# Patient Record
Sex: FEMALE | Race: WHITE
[De-identification: names, ages, dates, MRNs, and addresses within clinical notes are randomized per-mention and may not be internally consistent; named-entity substitution may affect disease eponyms.]

---

## 2017-09-28 VITALS — SYSTOLIC BLOOD PRESSURE: 165 MMHG | DIASTOLIC BLOOD PRESSURE: 79 MMHG

## 2017-10-09 ENCOUNTER — HOSPITAL ENCOUNTER (INPATIENT)
Dept: HOSPITAL 8 - OR | Age: 82
LOS: 1 days | Discharge: HOME | DRG: 739 | End: 2017-10-10
Attending: SPECIALIST | Admitting: SPECIALIST
Payer: MEDICARE

## 2017-10-09 VITALS — WEIGHT: 292.77 LBS | HEIGHT: 61 IN | BODY MASS INDEX: 55.28 KG/M2

## 2017-10-09 DIAGNOSIS — Z88.0: ICD-10-CM

## 2017-10-09 DIAGNOSIS — J96.11: ICD-10-CM

## 2017-10-09 DIAGNOSIS — E66.01: ICD-10-CM

## 2017-10-09 DIAGNOSIS — Z88.1: ICD-10-CM

## 2017-10-09 DIAGNOSIS — C54.1: Primary | ICD-10-CM

## 2017-10-09 DIAGNOSIS — Z88.2: ICD-10-CM

## 2017-10-09 DIAGNOSIS — E78.00: ICD-10-CM

## 2017-10-09 DIAGNOSIS — J96.21: ICD-10-CM

## 2017-10-09 PROCEDURE — 86850 RBC ANTIBODY SCREEN: CPT

## 2017-10-09 PROCEDURE — 85025 COMPLETE CBC W/AUTO DIFF WBC: CPT

## 2017-10-09 PROCEDURE — 0UT2FZZ RESECTION OF BILATERAL OVARIES, VIA NATURAL OR ARTIFICIAL OPENING WITH PERCUTANEOUS ENDOSCOPIC ASSISTANCE: ICD-10-PCS | Performed by: SPECIALIST

## 2017-10-09 PROCEDURE — 36415 COLL VENOUS BLD VENIPUNCTURE: CPT

## 2017-10-09 PROCEDURE — 88112 CYTOPATH CELL ENHANCE TECH: CPT

## 2017-10-09 PROCEDURE — 86923 COMPATIBILITY TEST ELECTRIC: CPT

## 2017-10-09 PROCEDURE — 88329 PATH CONSLTJ DRG SURG: CPT

## 2017-10-09 PROCEDURE — 0UT7FZZ RESECTION OF BILATERAL FALLOPIAN TUBES, VIA NATURAL OR ARTIFICIAL OPENING WITH PERCUTANEOUS ENDOSCOPIC ASSISTANCE: ICD-10-PCS | Performed by: SPECIALIST

## 2017-10-09 PROCEDURE — 88307 TISSUE EXAM BY PATHOLOGIST: CPT

## 2017-10-09 PROCEDURE — 88331 PATH CONSLTJ SURG 1 BLK 1SPC: CPT

## 2017-10-09 PROCEDURE — 88333 PATH CONSLTJ SURG CYTO XM 1: CPT

## 2017-10-09 PROCEDURE — 86900 BLOOD TYPING SEROLOGIC ABO: CPT

## 2017-10-09 PROCEDURE — 88342 IMHCHEM/IMCYTCHM 1ST ANTB: CPT

## 2017-10-09 PROCEDURE — 88305 TISSUE EXAM BY PATHOLOGIST: CPT

## 2017-10-09 PROCEDURE — 0UT9FZZ RESECTION OF UTERUS, VIA NATURAL OR ARTIFICIAL OPENING WITH PERCUTANEOUS ENDOSCOPIC ASSISTANCE: ICD-10-PCS | Performed by: SPECIALIST

## 2017-10-09 PROCEDURE — 07BC4ZX EXCISION OF PELVIS LYMPHATIC, PERCUTANEOUS ENDOSCOPIC APPROACH, DIAGNOSTIC: ICD-10-PCS | Performed by: SPECIALIST

## 2017-10-09 PROCEDURE — 80048 BASIC METABOLIC PNL TOTAL CA: CPT

## 2017-10-09 PROCEDURE — 8E0W4CZ ROBOTIC ASSISTED PROCEDURE OF TRUNK REGION, PERCUTANEOUS ENDOSCOPIC APPROACH: ICD-10-PCS | Performed by: SPECIALIST

## 2017-10-09 PROCEDURE — G0461 IMMUNOHISTO/CYTO CHEM 1ST ST: HCPCS

## 2017-10-09 PROCEDURE — 93005 ELECTROCARDIOGRAM TRACING: CPT

## 2017-10-09 PROCEDURE — 88309 TISSUE EXAM BY PATHOLOGIST: CPT

## 2017-10-09 RX ADMIN — OXYCODONE HYDROCHLORIDE PRN MG: 5 SOLUTION ORAL at 18:40

## 2017-10-09 RX ADMIN — SODIUM CHLORIDE, SODIUM LACTATE, POTASSIUM CHLORIDE, AND CALCIUM CHLORIDE SCH MLS/HR: .6; .31; .03; .02 INJECTION, SOLUTION INTRAVENOUS at 16:07

## 2017-10-09 RX ADMIN — SODIUM CHLORIDE, SODIUM LACTATE, POTASSIUM CHLORIDE, AND CALCIUM CHLORIDE SCH MLS/HR: .6; .31; .03; .02 INJECTION, SOLUTION INTRAVENOUS at 22:53

## 2017-10-09 RX ADMIN — METOCLOPRAMIDE HYDROCHLORIDE SCH MG: 10 TABLET ORAL at 23:19

## 2017-10-09 RX ADMIN — HYDROMORPHONE HYDROCHLORIDE PRN MG: 1 INJECTION, SOLUTION INTRAMUSCULAR; INTRAVENOUS; SUBCUTANEOUS at 19:14

## 2017-10-09 RX ADMIN — METOCLOPRAMIDE HYDROCHLORIDE SCH MG: 10 TABLET ORAL at 21:30

## 2017-10-09 RX ADMIN — HYDROMORPHONE HYDROCHLORIDE PRN MG: 1 INJECTION, SOLUTION INTRAMUSCULAR; INTRAVENOUS; SUBCUTANEOUS at 19:35

## 2017-10-10 VITALS — SYSTOLIC BLOOD PRESSURE: 124 MMHG | DIASTOLIC BLOOD PRESSURE: 64 MMHG

## 2017-10-10 VITALS — DIASTOLIC BLOOD PRESSURE: 49 MMHG | SYSTOLIC BLOOD PRESSURE: 104 MMHG

## 2017-10-10 VITALS — SYSTOLIC BLOOD PRESSURE: 93 MMHG | DIASTOLIC BLOOD PRESSURE: 48 MMHG

## 2017-10-10 VITALS — DIASTOLIC BLOOD PRESSURE: 52 MMHG | SYSTOLIC BLOOD PRESSURE: 106 MMHG

## 2017-10-10 LAB
BUN SERPL-MCNC: 11 MG/DL (ref 7–18)
HCT VFR BLD CALC: 38.3 % (ref 34.6–47.8)
HGB BLD-MCNC: 12.8 G/DL (ref 11.7–16.4)
WBC # BLD AUTO: 7.3 X10^3/UL (ref 3.4–10)

## 2017-10-10 RX ADMIN — OXYCODONE HYDROCHLORIDE PRN MG: 5 SOLUTION ORAL at 10:48

## 2017-10-10 RX ADMIN — METOCLOPRAMIDE HYDROCHLORIDE SCH MG: 10 TABLET ORAL at 11:22

## 2017-10-10 RX ADMIN — DEXTROSE, SODIUM CHLORIDE, AND POTASSIUM CHLORIDE SCH MLS/HR: 5; .45; .15 INJECTION INTRAVENOUS at 00:28

## 2017-10-10 RX ADMIN — METOCLOPRAMIDE HYDROCHLORIDE SCH MG: 10 TABLET ORAL at 04:47

## 2017-10-10 RX ADMIN — DEXTROSE, SODIUM CHLORIDE, AND POTASSIUM CHLORIDE SCH MLS/HR: 5; .45; .15 INJECTION INTRAVENOUS at 08:00

## 2022-11-01 ENCOUNTER — HOSPITAL ENCOUNTER (INPATIENT)
Facility: MEDICAL CENTER | Age: 87
LOS: 3 days | DRG: 243 | End: 2022-11-04
Attending: EMERGENCY MEDICINE | Admitting: INTERNAL MEDICINE
Payer: MEDICARE

## 2022-11-01 ENCOUNTER — APPOINTMENT (OUTPATIENT)
Dept: CARDIOLOGY | Facility: MEDICAL CENTER | Age: 87
DRG: 243 | End: 2022-11-01
Attending: INTERNAL MEDICINE
Payer: MEDICARE

## 2022-11-01 DIAGNOSIS — R55 SYNCOPE, UNSPECIFIED SYNCOPE TYPE: ICD-10-CM

## 2022-11-01 DIAGNOSIS — I45.5 SINUS PAUSE: ICD-10-CM

## 2022-11-01 DIAGNOSIS — I45.5 SINUS ARREST: ICD-10-CM

## 2022-11-01 DIAGNOSIS — I48.91 ATRIAL FIBRILLATION WITH RVR (HCC): ICD-10-CM

## 2022-11-01 LAB
ALBUMIN SERPL BCP-MCNC: 3.6 G/DL (ref 3.2–4.9)
ALBUMIN/GLOB SERPL: 1.4 G/DL
ALP SERPL-CCNC: 137 U/L (ref 30–99)
ALT SERPL-CCNC: 19 U/L (ref 2–50)
ANION GAP SERPL CALC-SCNC: 8 MMOL/L (ref 7–16)
AST SERPL-CCNC: 24 U/L (ref 12–45)
BASOPHILS # BLD AUTO: 0.3 % (ref 0–1.8)
BASOPHILS # BLD: 0.02 K/UL (ref 0–0.12)
BILIRUB SERPL-MCNC: 0.2 MG/DL (ref 0.1–1.5)
BUN SERPL-MCNC: 18 MG/DL (ref 8–22)
CALCIUM SERPL-MCNC: 8.8 MG/DL (ref 8.5–10.5)
CHLORIDE SERPL-SCNC: 101 MMOL/L (ref 96–112)
CO2 SERPL-SCNC: 29 MMOL/L (ref 20–33)
CREAT SERPL-MCNC: 0.76 MG/DL (ref 0.5–1.4)
EKG IMPRESSION: NORMAL
EOSINOPHIL # BLD AUTO: 0.07 K/UL (ref 0–0.51)
EOSINOPHIL NFR BLD: 1.2 % (ref 0–6.9)
ERYTHROCYTE [DISTWIDTH] IN BLOOD BY AUTOMATED COUNT: 46.8 FL (ref 35.9–50)
GFR SERPLBLD CREATININE-BSD FMLA CKD-EPI: 76 ML/MIN/1.73 M 2
GLOBULIN SER CALC-MCNC: 2.5 G/DL (ref 1.9–3.5)
GLUCOSE SERPL-MCNC: 107 MG/DL (ref 65–99)
HCT VFR BLD AUTO: 31.3 % (ref 37–47)
HGB BLD-MCNC: 9.7 G/DL (ref 12–16)
IMM GRANULOCYTES # BLD AUTO: 0.03 K/UL (ref 0–0.11)
IMM GRANULOCYTES NFR BLD AUTO: 0.5 % (ref 0–0.9)
LYMPHOCYTES # BLD AUTO: 1.29 K/UL (ref 1–4.8)
LYMPHOCYTES NFR BLD: 22.4 % (ref 22–41)
MCH RBC QN AUTO: 29.8 PG (ref 27–33)
MCHC RBC AUTO-ENTMCNC: 31 G/DL (ref 33.6–35)
MCV RBC AUTO: 96.3 FL (ref 81.4–97.8)
MONOCYTES # BLD AUTO: 0.44 K/UL (ref 0–0.85)
MONOCYTES NFR BLD AUTO: 7.6 % (ref 0–13.4)
NEUTROPHILS # BLD AUTO: 3.91 K/UL (ref 2–7.15)
NEUTROPHILS NFR BLD: 68 % (ref 44–72)
NRBC # BLD AUTO: 0 K/UL
NRBC BLD-RTO: 0 /100 WBC
PLATELET # BLD AUTO: 127 K/UL (ref 164–446)
PMV BLD AUTO: 10.5 FL (ref 9–12.9)
POTASSIUM SERPL-SCNC: 4.4 MMOL/L (ref 3.6–5.5)
PROT SERPL-MCNC: 6.1 G/DL (ref 6–8.2)
RBC # BLD AUTO: 3.25 M/UL (ref 4.2–5.4)
SODIUM SERPL-SCNC: 138 MMOL/L (ref 135–145)
TROPONIN T SERPL-MCNC: 22 NG/L (ref 6–19)
WBC # BLD AUTO: 5.8 K/UL (ref 4.8–10.8)

## 2022-11-01 PROCEDURE — 92953 TEMPORARY EXTERNAL PACING: CPT

## 2022-11-01 PROCEDURE — 85610 PROTHROMBIN TIME: CPT

## 2022-11-01 PROCEDURE — 85025 COMPLETE CBC W/AUTO DIFF WBC: CPT

## 2022-11-01 PROCEDURE — 85730 THROMBOPLASTIN TIME PARTIAL: CPT

## 2022-11-01 PROCEDURE — 02HK3JZ INSERTION OF PACEMAKER LEAD INTO RIGHT VENTRICLE, PERCUTANEOUS APPROACH: ICD-10-PCS | Performed by: INTERNAL MEDICINE

## 2022-11-01 PROCEDURE — 93005 ELECTROCARDIOGRAM TRACING: CPT

## 2022-11-01 PROCEDURE — 700101 HCHG RX REV CODE 250

## 2022-11-01 PROCEDURE — C1894 INTRO/SHEATH, NON-LASER: HCPCS

## 2022-11-01 PROCEDURE — 36415 COLL VENOUS BLD VENIPUNCTURE: CPT

## 2022-11-01 PROCEDURE — 84484 ASSAY OF TROPONIN QUANT: CPT

## 2022-11-01 PROCEDURE — 80053 COMPREHEN METABOLIC PANEL: CPT

## 2022-11-01 PROCEDURE — 5A2204Z RESTORATION OF CARDIAC RHYTHM, SINGLE: ICD-10-PCS | Performed by: EMERGENCY MEDICINE

## 2022-11-01 PROCEDURE — 5A1223Z PERFORMANCE OF CARDIAC PACING, CONTINUOUS: ICD-10-PCS | Performed by: INTERNAL MEDICINE

## 2022-11-01 PROCEDURE — 99223 1ST HOSP IP/OBS HIGH 75: CPT | Performed by: INTERNAL MEDICINE

## 2022-11-01 PROCEDURE — 770000 HCHG ROOM/CARE - INTERMEDIATE ICU *

## 2022-11-01 PROCEDURE — 99291 CRITICAL CARE FIRST HOUR: CPT

## 2022-11-01 RX ORDER — ONDANSETRON 2 MG/ML
4 INJECTION INTRAMUSCULAR; INTRAVENOUS EVERY 4 HOURS PRN
Status: DISCONTINUED | OUTPATIENT
Start: 2022-11-01 | End: 2022-11-02

## 2022-11-01 RX ORDER — OXYCODONE HYDROCHLORIDE 5 MG/1
2.5 TABLET ORAL
Status: DISCONTINUED | OUTPATIENT
Start: 2022-11-01 | End: 2022-11-04 | Stop reason: HOSPADM

## 2022-11-01 RX ORDER — OXYCODONE HYDROCHLORIDE 5 MG/1
5 TABLET ORAL
Status: DISCONTINUED | OUTPATIENT
Start: 2022-11-01 | End: 2022-11-04 | Stop reason: HOSPADM

## 2022-11-01 RX ORDER — AMOXICILLIN 250 MG
2 CAPSULE ORAL 2 TIMES DAILY
Status: DISCONTINUED | OUTPATIENT
Start: 2022-11-02 | End: 2022-11-04 | Stop reason: HOSPADM

## 2022-11-01 RX ORDER — MORPHINE SULFATE 4 MG/ML
2 INJECTION INTRAVENOUS
Status: DISCONTINUED | OUTPATIENT
Start: 2022-11-01 | End: 2022-11-04 | Stop reason: HOSPADM

## 2022-11-01 RX ORDER — LIDOCAINE HYDROCHLORIDE 20 MG/ML
INJECTION, SOLUTION INFILTRATION; PERINEURAL
Status: COMPLETED
Start: 2022-11-01 | End: 2022-11-01

## 2022-11-01 RX ORDER — ONDANSETRON 4 MG/1
4 TABLET, ORALLY DISINTEGRATING ORAL EVERY 4 HOURS PRN
Status: DISCONTINUED | OUTPATIENT
Start: 2022-11-01 | End: 2022-11-02

## 2022-11-01 RX ORDER — ENOXAPARIN SODIUM 100 MG/ML
40 INJECTION SUBCUTANEOUS DAILY
Status: DISCONTINUED | OUTPATIENT
Start: 2022-11-02 | End: 2022-11-02

## 2022-11-01 RX ORDER — BISACODYL 10 MG
10 SUPPOSITORY, RECTAL RECTAL
Status: DISCONTINUED | OUTPATIENT
Start: 2022-11-01 | End: 2022-11-04 | Stop reason: HOSPADM

## 2022-11-01 RX ORDER — HYDRALAZINE HYDROCHLORIDE 20 MG/ML
10 INJECTION INTRAMUSCULAR; INTRAVENOUS EVERY 4 HOURS PRN
Status: DISCONTINUED | OUTPATIENT
Start: 2022-11-01 | End: 2022-11-04 | Stop reason: HOSPADM

## 2022-11-01 RX ORDER — POLYETHYLENE GLYCOL 3350 17 G/17G
1 POWDER, FOR SOLUTION ORAL
Status: DISCONTINUED | OUTPATIENT
Start: 2022-11-01 | End: 2022-11-04 | Stop reason: HOSPADM

## 2022-11-01 RX ORDER — MIDAZOLAM HYDROCHLORIDE 1 MG/ML
INJECTION INTRAMUSCULAR; INTRAVENOUS
Status: COMPLETED
Start: 2022-11-01 | End: 2022-11-02

## 2022-11-01 RX ORDER — ACETAMINOPHEN 325 MG/1
650 TABLET ORAL EVERY 6 HOURS PRN
Status: DISCONTINUED | OUTPATIENT
Start: 2022-11-01 | End: 2022-11-04 | Stop reason: HOSPADM

## 2022-11-01 RX ADMIN — LIDOCAINE HYDROCHLORIDE: 20 INJECTION, SOLUTION INFILTRATION; PERINEURAL at 23:30

## 2022-11-02 ENCOUNTER — APPOINTMENT (OUTPATIENT)
Dept: CARDIOLOGY | Facility: MEDICAL CENTER | Age: 87
DRG: 243 | End: 2022-11-02
Attending: INTERNAL MEDICINE
Payer: MEDICARE

## 2022-11-02 ENCOUNTER — APPOINTMENT (OUTPATIENT)
Dept: CARDIOLOGY | Facility: MEDICAL CENTER | Age: 87
DRG: 243 | End: 2022-11-02
Attending: NURSE PRACTITIONER
Payer: MEDICARE

## 2022-11-02 ENCOUNTER — APPOINTMENT (OUTPATIENT)
Dept: RADIOLOGY | Facility: MEDICAL CENTER | Age: 87
DRG: 243 | End: 2022-11-02
Attending: INTERNAL MEDICINE
Payer: MEDICARE

## 2022-11-02 PROBLEM — J44.9 COPD (CHRONIC OBSTRUCTIVE PULMONARY DISEASE) (HCC): Status: ACTIVE | Noted: 2022-11-02

## 2022-11-02 PROBLEM — R00.1 BRADYCARDIA: Status: ACTIVE | Noted: 2022-11-02

## 2022-11-02 PROBLEM — K21.9 GERD (GASTROESOPHAGEAL REFLUX DISEASE): Status: ACTIVE | Noted: 2022-11-02

## 2022-11-02 PROBLEM — I48.91 ATRIAL FIBRILLATION WITH RVR (HCC): Status: ACTIVE | Noted: 2022-11-02

## 2022-11-02 LAB
ALBUMIN SERPL BCP-MCNC: 3.2 G/DL (ref 3.2–4.9)
ALBUMIN/GLOB SERPL: 1.2 G/DL
ALP SERPL-CCNC: 136 U/L (ref 30–99)
ALT SERPL-CCNC: 18 U/L (ref 2–50)
ANION GAP SERPL CALC-SCNC: 7 MMOL/L (ref 7–16)
APTT PPP: 33.1 SEC (ref 24.7–36)
AST SERPL-CCNC: 22 U/L (ref 12–45)
BASOPHILS # BLD AUTO: 0.5 % (ref 0–1.8)
BASOPHILS # BLD: 0.03 K/UL (ref 0–0.12)
BILIRUB SERPL-MCNC: 0.2 MG/DL (ref 0.1–1.5)
BUN SERPL-MCNC: 17 MG/DL (ref 8–22)
CALCIUM SERPL-MCNC: 8.5 MG/DL (ref 8.5–10.5)
CHLORIDE SERPL-SCNC: 105 MMOL/L (ref 96–112)
CO2 SERPL-SCNC: 29 MMOL/L (ref 20–33)
CREAT SERPL-MCNC: 0.77 MG/DL (ref 0.5–1.4)
D DIMER PPP IA.FEU-MCNC: 1.47 UG/ML (FEU) (ref 0–0.5)
EOSINOPHIL # BLD AUTO: 0.08 K/UL (ref 0–0.51)
EOSINOPHIL NFR BLD: 1.4 % (ref 0–6.9)
ERYTHROCYTE [DISTWIDTH] IN BLOOD BY AUTOMATED COUNT: 46 FL (ref 35.9–50)
GFR SERPLBLD CREATININE-BSD FMLA CKD-EPI: 75 ML/MIN/1.73 M 2
GLOBULIN SER CALC-MCNC: 2.7 G/DL (ref 1.9–3.5)
GLUCOSE SERPL-MCNC: 107 MG/DL (ref 65–99)
HCT VFR BLD AUTO: 31.6 % (ref 37–47)
HGB BLD-MCNC: 9.7 G/DL (ref 12–16)
IMM GRANULOCYTES # BLD AUTO: 0.02 K/UL (ref 0–0.11)
IMM GRANULOCYTES NFR BLD AUTO: 0.4 % (ref 0–0.9)
INR PPP: 1.13 (ref 0.87–1.13)
LV EJECT FRACT  99904: 60
LV EJECT FRACT MOD 2C 99903: 45.29
LV EJECT FRACT MOD 4C 99902: 28.55
LV EJECT FRACT MOD BP 99901: 39.58
LYMPHOCYTES # BLD AUTO: 1.54 K/UL (ref 1–4.8)
LYMPHOCYTES NFR BLD: 27.7 % (ref 22–41)
MCH RBC QN AUTO: 29.3 PG (ref 27–33)
MCHC RBC AUTO-ENTMCNC: 30.7 G/DL (ref 33.6–35)
MCV RBC AUTO: 95.5 FL (ref 81.4–97.8)
MONOCYTES # BLD AUTO: 0.54 K/UL (ref 0–0.85)
MONOCYTES NFR BLD AUTO: 9.7 % (ref 0–13.4)
NEUTROPHILS # BLD AUTO: 3.35 K/UL (ref 2–7.15)
NEUTROPHILS NFR BLD: 60.3 % (ref 44–72)
NRBC # BLD AUTO: 0 K/UL
NRBC BLD-RTO: 0 /100 WBC
NT-PROBNP SERPL IA-MCNC: 798 PG/ML (ref 0–125)
PLATELET # BLD AUTO: 123 K/UL (ref 164–446)
PMV BLD AUTO: 10.2 FL (ref 9–12.9)
POTASSIUM SERPL-SCNC: 4.3 MMOL/L (ref 3.6–5.5)
PROT SERPL-MCNC: 5.9 G/DL (ref 6–8.2)
PROTHROMBIN TIME: 14.4 SEC (ref 12–14.6)
RBC # BLD AUTO: 3.31 M/UL (ref 4.2–5.4)
SODIUM SERPL-SCNC: 141 MMOL/L (ref 135–145)
TSH SERPL DL<=0.005 MIU/L-ACNC: 3.77 UIU/ML (ref 0.38–5.33)
WBC # BLD AUTO: 5.6 K/UL (ref 4.8–10.8)

## 2022-11-02 PROCEDURE — 93306 TTE W/DOPPLER COMPLETE: CPT

## 2022-11-02 PROCEDURE — 80053 COMPREHEN METABOLIC PANEL: CPT

## 2022-11-02 PROCEDURE — 0JH606Z INSERTION OF PACEMAKER, DUAL CHAMBER INTO CHEST SUBCUTANEOUS TISSUE AND FASCIA, OPEN APPROACH: ICD-10-PCS | Performed by: INTERNAL MEDICINE

## 2022-11-02 PROCEDURE — 700101 HCHG RX REV CODE 250

## 2022-11-02 PROCEDURE — 700101 HCHG RX REV CODE 250: Performed by: INTERNAL MEDICINE

## 2022-11-02 PROCEDURE — 85025 COMPLETE CBC W/AUTO DIFF WBC: CPT

## 2022-11-02 PROCEDURE — 33210 INSERT ELECTRD/PM CATH SNGL: CPT | Performed by: INTERNAL MEDICINE

## 2022-11-02 PROCEDURE — 02H60JZ INSERTION OF PACEMAKER LEAD INTO RIGHT ATRIUM, OPEN APPROACH: ICD-10-PCS | Performed by: INTERNAL MEDICINE

## 2022-11-02 PROCEDURE — 700111 HCHG RX REV CODE 636 W/ 250 OVERRIDE (IP)

## 2022-11-02 PROCEDURE — 770020 HCHG ROOM/CARE - TELE (206)

## 2022-11-02 PROCEDURE — 99152 MOD SED SAME PHYS/QHP 5/>YRS: CPT | Performed by: INTERNAL MEDICINE

## 2022-11-02 PROCEDURE — 700102 HCHG RX REV CODE 250 W/ 637 OVERRIDE(OP): Performed by: INTERNAL MEDICINE

## 2022-11-02 PROCEDURE — A9270 NON-COVERED ITEM OR SERVICE: HCPCS | Performed by: INTERNAL MEDICINE

## 2022-11-02 PROCEDURE — 93306 TTE W/DOPPLER COMPLETE: CPT | Mod: 26 | Performed by: INTERNAL MEDICINE

## 2022-11-02 PROCEDURE — 84443 ASSAY THYROID STIM HORMONE: CPT

## 2022-11-02 PROCEDURE — 33208 INSRT HEART PM ATRIAL & VENT: CPT | Mod: KX | Performed by: INTERNAL MEDICINE

## 2022-11-02 PROCEDURE — 85379 FIBRIN DEGRADATION QUANT: CPT

## 2022-11-02 PROCEDURE — 99223 1ST HOSP IP/OBS HIGH 75: CPT | Mod: AI | Performed by: INTERNAL MEDICINE

## 2022-11-02 PROCEDURE — 71045 X-RAY EXAM CHEST 1 VIEW: CPT

## 2022-11-02 PROCEDURE — 700111 HCHG RX REV CODE 636 W/ 250 OVERRIDE (IP): Performed by: INTERNAL MEDICINE

## 2022-11-02 PROCEDURE — 83880 ASSAY OF NATRIURETIC PEPTIDE: CPT

## 2022-11-02 PROCEDURE — 02HK0JZ INSERTION OF PACEMAKER LEAD INTO RIGHT VENTRICLE, OPEN APPROACH: ICD-10-PCS | Performed by: INTERNAL MEDICINE

## 2022-11-02 PROCEDURE — C1898 LEAD, PMKR, OTHER THAN TRANS: HCPCS

## 2022-11-02 PROCEDURE — C1894 INTRO/SHEATH, NON-LASER: HCPCS

## 2022-11-02 RX ORDER — TOLTERODINE 2 MG/1
2 CAPSULE, EXTENDED RELEASE ORAL DAILY
COMMUNITY

## 2022-11-02 RX ORDER — OXYBUTYNIN CHLORIDE 5 MG/1
5 TABLET ORAL DAILY
Status: DISCONTINUED | OUTPATIENT
Start: 2022-11-02 | End: 2022-11-04 | Stop reason: HOSPADM

## 2022-11-02 RX ORDER — LIDOCAINE HYDROCHLORIDE 20 MG/ML
INJECTION, SOLUTION INFILTRATION; PERINEURAL
Status: COMPLETED
Start: 2022-11-02 | End: 2022-11-02

## 2022-11-02 RX ORDER — MIDAZOLAM HYDROCHLORIDE 1 MG/ML
INJECTION INTRAMUSCULAR; INTRAVENOUS
Status: COMPLETED
Start: 2022-11-02 | End: 2022-11-02

## 2022-11-02 RX ORDER — POLYETHYLENE GLYCOL 3350 17 G/17G
17 POWDER, FOR SOLUTION ORAL DAILY
COMMUNITY

## 2022-11-02 RX ORDER — ATORVASTATIN CALCIUM 10 MG/1
10 TABLET, FILM COATED ORAL DAILY
Status: DISCONTINUED | OUTPATIENT
Start: 2022-11-02 | End: 2022-11-04 | Stop reason: HOSPADM

## 2022-11-02 RX ORDER — POTASSIUM CHLORIDE 1.5 G/1.58G
20 POWDER, FOR SOLUTION ORAL 2 TIMES DAILY
COMMUNITY

## 2022-11-02 RX ORDER — LIDOCAINE 50 MG/G
2 PATCH TOPICAL EVERY 24 HOURS
Status: ON HOLD | COMMUNITY
End: 2022-11-03

## 2022-11-02 RX ORDER — BUMETANIDE 1 MG/1
1 TABLET ORAL DAILY
COMMUNITY

## 2022-11-02 RX ORDER — SERTRALINE HYDROCHLORIDE 100 MG/1
200 TABLET, FILM COATED ORAL DAILY
Status: DISCONTINUED | OUTPATIENT
Start: 2022-11-02 | End: 2022-11-04 | Stop reason: HOSPADM

## 2022-11-02 RX ORDER — LIDOCAINE 50 MG/G
1 OINTMENT TOPICAL
COMMUNITY

## 2022-11-02 RX ORDER — GABAPENTIN 100 MG/1
100 CAPSULE ORAL 2 TIMES DAILY
COMMUNITY

## 2022-11-02 RX ORDER — OMEPRAZOLE 20 MG/1
20 CAPSULE, DELAYED RELEASE ORAL DAILY
COMMUNITY

## 2022-11-02 RX ORDER — BUMETANIDE 1 MG/1
1 TABLET ORAL DAILY
Status: DISCONTINUED | OUTPATIENT
Start: 2022-11-02 | End: 2022-11-04 | Stop reason: HOSPADM

## 2022-11-02 RX ORDER — POTASSIUM CHLORIDE 20 MEQ/1
20 TABLET, EXTENDED RELEASE ORAL DAILY
Status: DISCONTINUED | OUTPATIENT
Start: 2022-11-02 | End: 2022-11-04 | Stop reason: HOSPADM

## 2022-11-02 RX ORDER — HYDROCODONE BITARTRATE AND ACETAMINOPHEN 5; 325 MG/1; MG/1
.5-1 TABLET ORAL
COMMUNITY

## 2022-11-02 RX ORDER — DOCUSATE SODIUM 50 MG/5ML
100 LIQUID ORAL 2 TIMES DAILY
Status: DISCONTINUED | OUTPATIENT
Start: 2022-11-02 | End: 2022-11-04 | Stop reason: HOSPADM

## 2022-11-02 RX ORDER — GABAPENTIN 100 MG/1
100 CAPSULE ORAL 2 TIMES DAILY
Status: DISCONTINUED | OUTPATIENT
Start: 2022-11-02 | End: 2022-11-04 | Stop reason: HOSPADM

## 2022-11-02 RX ORDER — LIDOCAINE 50 MG/G
2 PATCH TOPICAL EVERY 24 HOURS
Status: DISCONTINUED | OUTPATIENT
Start: 2022-11-02 | End: 2022-11-04 | Stop reason: HOSPADM

## 2022-11-02 RX ORDER — HYDROCODONE BITARTRATE AND ACETAMINOPHEN 5; 325 MG/1; MG/1
0.5 TABLET ORAL EVERY 6 HOURS PRN
Status: DISCONTINUED | OUTPATIENT
Start: 2022-11-02 | End: 2022-11-04 | Stop reason: HOSPADM

## 2022-11-02 RX ORDER — M-VIT,TX,IRON,MINS/CALC/FOLIC 27MG-0.4MG
1 TABLET ORAL DAILY
COMMUNITY

## 2022-11-02 RX ORDER — CEFAZOLIN SODIUM 1 G/3ML
INJECTION, POWDER, FOR SOLUTION INTRAMUSCULAR; INTRAVENOUS
Status: COMPLETED
Start: 2022-11-02 | End: 2022-11-02

## 2022-11-02 RX ORDER — ALBUTEROL SULFATE 90 UG/1
2 AEROSOL, METERED RESPIRATORY (INHALATION)
Status: DISCONTINUED | OUTPATIENT
Start: 2022-11-02 | End: 2022-11-04 | Stop reason: HOSPADM

## 2022-11-02 RX ORDER — CELECOXIB 100 MG/1
100 CAPSULE ORAL 2 TIMES DAILY
COMMUNITY

## 2022-11-02 RX ORDER — ATORVASTATIN CALCIUM 10 MG/1
10 TABLET, FILM COATED ORAL DAILY
COMMUNITY

## 2022-11-02 RX ORDER — OMEPRAZOLE 20 MG/1
20 CAPSULE, DELAYED RELEASE ORAL DAILY
Status: DISCONTINUED | OUTPATIENT
Start: 2022-11-02 | End: 2022-11-04 | Stop reason: HOSPADM

## 2022-11-02 RX ORDER — ASPIRIN 81 MG
100 TABLET, DELAYED RELEASE (ENTERIC COATED) ORAL 2 TIMES DAILY
COMMUNITY

## 2022-11-02 RX ORDER — LIDOCAINE 50 MG/G
OINTMENT TOPICAL DAILY
Status: DISCONTINUED | OUTPATIENT
Start: 2022-11-02 | End: 2022-11-04 | Stop reason: HOSPADM

## 2022-11-02 RX ADMIN — CEFAZOLIN 3000 MG: 330 INJECTION, POWDER, FOR SOLUTION INTRAMUSCULAR; INTRAVENOUS at 13:24

## 2022-11-02 RX ADMIN — POTASSIUM CHLORIDE 20 MEQ: 20 TABLET, EXTENDED RELEASE ORAL at 06:00

## 2022-11-02 RX ADMIN — LIDOCAINE HYDROCHLORIDE: 20 INJECTION, SOLUTION INFILTRATION; PERINEURAL at 13:46

## 2022-11-02 RX ADMIN — FENTANYL CITRATE 100 MCG: 50 INJECTION, SOLUTION INTRAMUSCULAR; INTRAVENOUS at 13:55

## 2022-11-02 RX ADMIN — MIDAZOLAM 2 MG: 1 INJECTION, SOLUTION INTRAMUSCULAR; INTRAVENOUS at 13:46

## 2022-11-02 RX ADMIN — SENNOSIDES AND DOCUSATE SODIUM 2 TABLET: 50; 8.6 TABLET ORAL at 06:23

## 2022-11-02 RX ADMIN — ENOXAPARIN SODIUM 40 MG: 40 INJECTION SUBCUTANEOUS at 02:17

## 2022-11-02 RX ADMIN — LIDOCAINE 2 PATCH: 50 PATCH CUTANEOUS at 06:31

## 2022-11-02 RX ADMIN — DOCUSATE SODIUM 100 MG: 50 LIQUID ORAL at 06:31

## 2022-11-02 RX ADMIN — MIDAZOLAM 1 MG: 1 INJECTION, SOLUTION INTRAMUSCULAR; INTRAVENOUS at 00:14

## 2022-11-02 RX ADMIN — LIDOCAINE: 50 OINTMENT TOPICAL at 06:32

## 2022-11-02 RX ADMIN — OMEPRAZOLE 20 MG: 20 CAPSULE, DELAYED RELEASE ORAL at 06:23

## 2022-11-02 RX ADMIN — SERTRALINE 200 MG: 100 TABLET, FILM COATED ORAL at 06:40

## 2022-11-02 RX ADMIN — FENTANYL CITRATE 50 MCG: 50 INJECTION, SOLUTION INTRAMUSCULAR; INTRAVENOUS at 00:14

## 2022-11-02 RX ADMIN — GABAPENTIN 100 MG: 100 CAPSULE ORAL at 06:24

## 2022-11-02 RX ADMIN — BUMETANIDE 1 MG: 1 TABLET ORAL at 06:28

## 2022-11-02 RX ADMIN — CEFAZOLIN 1000 MG: 330 INJECTION, POWDER, FOR SOLUTION INTRAMUSCULAR; INTRAVENOUS at 13:54

## 2022-11-02 RX ADMIN — OXYBUTYNIN CHLORIDE 5 MG: 5 TABLET ORAL at 06:24

## 2022-11-02 RX ADMIN — FENTANYL CITRATE 100 MCG: 50 INJECTION, SOLUTION INTRAMUSCULAR; INTRAVENOUS at 13:46

## 2022-11-02 RX ADMIN — GABAPENTIN 100 MG: 100 CAPSULE ORAL at 18:00

## 2022-11-02 RX ADMIN — ATORVASTATIN CALCIUM 10 MG: 10 TABLET, FILM COATED ORAL at 06:24

## 2022-11-02 ASSESSMENT — ENCOUNTER SYMPTOMS
PHOTOPHOBIA: 0
SPEECH CHANGE: 0
BLOOD IN STOOL: 0
LOSS OF CONSCIOUSNESS: 0
CHILLS: 0
FOCAL WEAKNESS: 0
ABDOMINAL PAIN: 0
FLANK PAIN: 0
POLYDIPSIA: 0
CHEST TIGHTNESS: 0
DOUBLE VISION: 0
BRUISES/BLEEDS EASILY: 0
DIZZINESS: 0
SHORTNESS OF BREATH: 0
COUGH: 0
SORE THROAT: 0
BACK PAIN: 0
DIARRHEA: 0
SPUTUM PRODUCTION: 0
HEARTBURN: 0
WEIGHT LOSS: 0
TREMORS: 0
HEADACHES: 0
NAUSEA: 0
NECK PAIN: 0
HALLUCINATIONS: 0
LOSS OF CONSCIOUSNESS: 1
PALPITATIONS: 0
BLURRED VISION: 0
ORTHOPNEA: 0
NERVOUS/ANXIOUS: 0
VOMITING: 0
HEMOPTYSIS: 0
FEVER: 0

## 2022-11-02 ASSESSMENT — LIFESTYLE VARIABLES: SUBSTANCE_ABUSE: 0

## 2022-11-02 ASSESSMENT — PATIENT HEALTH QUESTIONNAIRE - PHQ9
2. FEELING DOWN, DEPRESSED, IRRITABLE, OR HOPELESS: NOT AT ALL
SUM OF ALL RESPONSES TO PHQ9 QUESTIONS 1 AND 2: 0
1. LITTLE INTEREST OR PLEASURE IN DOING THINGS: NOT AT ALL

## 2022-11-02 ASSESSMENT — FIBROSIS 4 INDEX: FIB4 SCORE: 3.58

## 2022-11-02 ASSESSMENT — PAIN DESCRIPTION - PAIN TYPE: TYPE: CHRONIC PAIN

## 2022-11-02 NOTE — CARE PLAN
The patient is Watcher - Medium risk of patient condition declining or worsening    Shift Goals  Clinical Goals: stable HR  Patient Goals: stable HR      Problem: Knowledge Deficit - Standard  Goal: Patient and family/care givers will demonstrate understanding of plan of care, disease process/condition, diagnostic tests and medications  Outcome: Progressing     Problem: Skin Integrity  Goal: Skin integrity is maintained or improved  Outcome: Progressing     Problem: Fall Risk  Goal: Patient will remain free from falls  Outcome: Progressing     Problem: Pain - Standard  Goal: Alleviation of pain or a reduction in pain to the patient’s comfort goal  Outcome: Progressing

## 2022-11-02 NOTE — CONSULTS
CARDIOLOGY CONSULTATION NOTE      Date of Consultation: 11/1/2022  Consulting Provider: Harsha Chowdhury MD    Patient Name: Lindsey Shell    YOB: 1935  MRN: [id number]    Reason for Consultation:   Syncope, sinus arrest    History of Present Illness:   Lindsey Shell is an 86 yr old woman with anxiety/depression, DL, ? COPD O2 dependent 3.5L via NC, sedentary, class 3 obesity, lives in nursing home at [place] transferred for several witnessed syncopal events with reports of up to 20 sec pauses and intermittent A fib with RVR. Reviewed EMS strips showing A fib with RVR and IVCD and sinus arrest episodes.    Non-smoker. No known DM, HTN, CVA/TIA, CHF , A fib history. Denies marijuana use.    In the ER, intermittently transcutaneously paced with baseline SR.    ECG in the ER: personally reviewed  SR, poor R wave progression, artifact, possible age indeterminate anterior infarct, low voltage, IVCD    2 daughters and 2 grand-daughters at bedside.    Intermittently transcutaneously paced during encounter, bothering her.    Family History:  No known heart disease among parents or siblings. Has granddaughter with WPW (at bedside).    Medical History:     Past Medical History:   Diagnosis Date    Chronic obstructive pulmonary disease (HCC)        Surgical History:   History reviewed. No pertinent surgical history.    Family History:   History reviewed. No pertinent family history.    Social History:    reports that she has never smoked. She has never used smokeless tobacco. She reports that she does not currently use alcohol. She reports that she does not use drugs.  The patient is a non smoker    Medications and Allergies:     Current Facility-Administered Medications   Medication Dose Route Frequency Provider Last Rate Last Admin    LIDOCAINE HCL 2 % INJ SOLN              No current outpatient medications on file.       Allergies   Allergen Reactions    Erythromycin Swelling     Hand swelling     "Penicillins Swelling     Mouth, denies anaphylaxis     Sulfa Drugs Swelling     Mouth, denies anaphylaxis        Review of Systems:   A pertinent review of systems was performed and was unremarkable except as per HPI above.    Vital Signs:   /60   Pulse 67   Resp 16   Ht 1.549 m (5' 1\")   Wt 119 kg (263 lb)   SpO2 97%   BMI 49.69 kg/m²   Vitals:    11/01/22 2226 11/01/22 2300 11/01/22 2306   BP:  114/56 124/60   Pulse:  65 67   Resp:  16 16   SpO2:  96% 97%   Weight: 119 kg (263 lb)     Height: 1.549 m (5' 1\")       Body mass index is 49.69 kg/m².  Oxygen Therapy:  Pulse Oximetry: 97 %, O2 (LPM): 3.5, O2 Delivery Device: Nasal Cannula  Physical Exam    Physical Examination:     General: Chronically ill appearing, No acute distress. Intermittently transcutaneously paced during encounter.  HEENT: EOM grossly intact, no scleral icterus, no pharyngeal erythema.  Neck:  JVD difficult to assess, no bruits, trachea midline  Cardiovascular: Regular rate and rhythm. Normal S1, S2. Distant heart sounds. Possible grade 2/3 systolic murmur no R/G. Distant heart sounds with intermittent pacing limits accurate murmur evaluation. Trace LE edema with non-pitting edema primarily.   2+ radial pulses  Pulmonary: decreased air entry with mild rhonchi. No wheezing or crackles. Normal respiratory effort.  Abdomen: Soft, NT, protuberant.  MSK/Ext: No clubbing or cyanosis.  Skin: Warm and dry, no rashes.  Psych: A&O x 3, appropriate affect.    Laboratories:   CrCl cannot be calculated (No successful lab value found.).  No results for input(s): CREATININE, BUN, POTASSIUM, SODIUM, CALCIUM, MAGNESIUM, CO2, ALBUMIN in the last 72 hours.  No results for input(s): GLUCOSE, POCGLUCOSE in the last 72 hours.  No results for input(s): ASTSGOT, ALTSGPT, ALKPHOSPHAT, INR in the last 72 hours.    Invalid input(s): BILI  No results for input(s): WBC, HEMOGLOBIN, PLATELETCT in the last 72 hours.  No results for input(s): TROPONINT, NTPROBNP, " HBA1C in the last 72 hours.  No results found for: LDL  No results found for: HDL    No results found for: TRIGLYCERIDE    No results found for: CHOLSTRLTOT    Assessment and Recommendations:   # Sinus arrest  # New A fib with RVR with aberrancy  # SR in ER  # Systolic murmur  # no known prior CV medical history    - plan for emergent temp wire tonight  - TTE in am and accordingly can discuss with EP PPM or AICD plan (if EF is low). QRS<100ms while in SR.  - hold off AC until PPM / AICD plan is finalized  - further recs to follow based on progress and testing results  - discussed brief GOC with family at bedside, prefers to think about code status before finalizing. For now, she is  DNI.  - check TSH, D-dimer, NT proBNP, trop, CMP, CBC    Cardiology will continue to follow along.    Please contact us with any questions.    Richard Delarosa MD, MPH FACC [facility]  Interventional Cardiologist  [facility]   of Clinical Internal Medicine - [facility]

## 2022-11-02 NOTE — CONSULTS
Consults    [facility] Acceptance Note    Called by Dr. Gardner for admission to IMCU for temporary transvenous pacemaker.  Will admit to IMCU under the care of the hospitalist (Dr. Melo).    Enrrique Oates MD

## 2022-11-02 NOTE — PROGRESS NOTES
Pt received from cath lab. Transvenous pacer in place via R IJ sheath. Pt no complaints at this time. Call light and needs within reach. Will continue to monitor closely.

## 2022-11-02 NOTE — ASSESSMENT & PLAN NOTE
New onset.  Cardioverted to sinus rhythm  Monitor on telemetry  Relatively benign TTE with preserved LVEF  CHADS/VASC 3: points for age and sex  Start DOAC

## 2022-11-02 NOTE — CONSULTS
"Cardiology Initial Consultation    Date of Service  11/2/2022    Referring Physician  Alton Bae, *    Reason for Consultation  pAF with RVR,    History of Presenting Illness  Lindsey Shell is a 86 y.o. female admitted from nursing home in [place] with multiple syncopal episodes with AF with RVR and observed sinus arrest pauses.     Per EMS report patient rhythm was AF with RVR (130's-140's) with bradycardia and long sinus pauses. Patient received 150 mg amio in route.     Patient arrived with transcutaneous pacer in place. Underwent temporary pacemaker placement with am with Dr. Delarosa.     Currently maintaining NSR. TVP in place, lower rate reduced to 50 bpm by Dr. Patel.    EP consulted for rhythm management and PPM placement.     TTE reviewed by Dr. Patel showed no significant valvular abnormality with preserved LVEF.     Other past medical history pertinent for COPD with O2 dependence (3.5 L via NC), obesity and depression.    Patient states that she had an episode of syncope approximately one year ago and was in her normal states of health until yesterday when she began having episodes of presyncope. States that she would feel dizzy and as if the \"floor fell out from under me\". This was followed by two ashia episodes of syncope.     Denies any other cardiac symptoms such as palpitations, SOB or chest pain.     Granddaughter has WPW/ Denies any other significant family cardiac history.    Denies toxic habits.     Labs reviewed showing TSH WNL and no significant electrolyte abnormality.     Patient is right handed.     Review of Systems  Review of Systems   Constitutional:  Negative for fever.   Respiratory:  Negative for chest tightness and shortness of breath.    Cardiovascular:  Positive for leg swelling (Chronic). Negative for chest pain and palpitations.   Gastrointestinal:  Negative for abdominal pain and blood in stool.   Genitourinary:  Negative for hematuria.   Musculoskeletal:  " Negative for gait problem.   Neurological:  Negative for dizziness and syncope.   All other systems reviewed and are negative.    Past Medical History   has a past medical history of Chronic obstructive pulmonary disease (HCC).    Surgical History   has no past surgical history on file.    Family History  family history is not on file.    Social History   reports that she has never smoked. She has never used smokeless tobacco. She reports that she does not currently use alcohol. She reports that she does not use drugs.    Medications  Prior to Admission Medications   Prescriptions Last Dose Informant Patient Reported? Taking?   CALCIUM CARB-CHOLECALCIFEROL PO   Yes Yes   Sig: Take 1 Tablet by mouth 2 times a day with meals.   HYDROcodone-acetaminophen (NORCO) 5-325 MG Tab per tablet   Yes Yes   Sig: Take 0.5 Tablets by mouth 3 times a day.   atorvastatin (LIPITOR) 10 MG Tab   Yes Yes   Sig: Take 10 mg by mouth every day.   bumetanide (BUMEX) 1 MG Tab   Yes Yes   Sig: Take 1 mg by mouth every day. Hold if SBP < 100 pr DBP < 60   celecoxib (CELEBREX) 100 MG Cap   Yes Yes   Sig: Take 100 mg by mouth 2 times a day.   diclofenac sodium (VOLTAREN) 1 % Gel   Yes Yes   Sig: Apply  topically 3 times a day. Apply to R shoulder   docusate sodium (COLACE) 100 MG/10ML Liquid   Yes Yes   Sig: Take 100 mg by mouth 2 times a day.   gabapentin (NEURONTIN) 100 MG Cap   Yes Yes   Sig: Take 100 mg by mouth 2 times a day.   lidocaine (LIDODERM) 5 % Patch   Yes Yes   Sig: Place 2 Patches on the skin every 24 hours. BL knees   lidocaine (XYLOCAINE) 5 % Ointment   Yes Yes   Sig: Apply  topically 3 times a day. Apply to BL hands   omeprazole (PRILOSEC) 20 MG delayed-release capsule   Yes Yes   Sig: Take 20 mg by mouth every day.   polyethylene glycol/lytes (MIRALAX) 17 g Pack   Yes Yes   Sig: Take 17 g by mouth every day.   potassium chloride (KLOR-CON) 20 MEQ Pack   Yes Yes   Sig: Take 20 mEq by mouth 2 times a day.   sertraline (ZOLOFT)  100 MG Tab   Yes Yes   Sig: Take 200 mg by mouth every day.   therapeutic multivitamin-minerals (THERAGRAN-M) Tab   Yes Yes   Sig: Take 1 Tablet by mouth every day.   tolterodine ER (DETROL-LA) 2 MG CAPSULE SR 24 HR   Yes Yes   Sig: Take 2 mg by mouth every day.      Facility-Administered Medications: None       Allergies  Allergies   Allergen Reactions    Erythromycin Swelling     Hand swelling    Penicillins Swelling     Mouth, denies anaphylaxis     Sulfa Drugs Swelling     Mouth, denies anaphylaxis        Vital signs in last 24 hours  Temp:  [36.7 °C (98.1 °F)] 36.7 °C (98.1 °F)  Pulse:  [57-72] 61  Resp:  [12-59] 12  BP: ()/(54-67) 95/54  SpO2:  [93 %-98 %] 98 %    Physical Exam  Physical Exam  Constitutional:       General: She is not in acute distress.     Appearance: Normal appearance.   HENT:      Head: Normocephalic.   Eyes:      Extraocular Movements: Extraocular movements intact.   Neck:      Comments: RIJ TVP in place.   Cardiovascular:      Rate and Rhythm: Normal rate and regular rhythm.      Pulses: Normal pulses.      Heart sounds: Normal heart sounds.   Pulmonary:      Effort: Pulmonary effort is normal.      Breath sounds: Normal breath sounds.   Abdominal:      Palpations: Abdomen is soft.      Tenderness: There is no abdominal tenderness.   Musculoskeletal:      Cervical back: Normal range of motion.      Right lower leg: No edema.      Left lower leg: No edema.   Skin:     General: Skin is warm and dry.   Neurological:      Mental Status: She is alert and oriented to person, place, and time.   Psychiatric:         Mood and Affect: Mood normal.         Behavior: Behavior normal.         Thought Content: Thought content normal.       Lab Review  Lab Results   Component Value Date/Time    WBC 5.6 11/02/2022 01:05 AM    RBC 3.31 (L) 11/02/2022 01:05 AM    HEMOGLOBIN 9.7 (L) 11/02/2022 01:05 AM    HEMATOCRIT 31.6 (L) 11/02/2022 01:05 AM    MCV 95.5 11/02/2022 01:05 AM    MCH 29.3 11/02/2022  01:05 AM    MCHC 30.7 (L) 11/02/2022 01:05 AM    MPV 10.2 11/02/2022 01:05 AM      Lab Results   Component Value Date/Time    SODIUM 141 11/02/2022 01:05 AM    POTASSIUM 4.3 11/02/2022 01:05 AM    CHLORIDE 105 11/02/2022 01:05 AM    CO2 29 11/02/2022 01:05 AM    GLUCOSE 107 (H) 11/02/2022 01:05 AM    BUN 17 11/02/2022 01:05 AM    CREATININE 0.77 11/02/2022 01:05 AM      Lab Results   Component Value Date/Time    ASTSGOT 22 11/02/2022 01:05 AM    ALTSGPT 18 11/02/2022 01:05 AM     Lab Results   Component Value Date/Time    TROPONINT 22 (H) 11/01/2022 10:35 PM       Recent Labs     11/02/22  0105   NTPROBNP 798*         Assessment/Plan  Tachy-Newton Syndrome  Sinus Arrest   pAF with RVR    - Plan for permanent PPM placement today with Dr. Salbador Gray. TTE showed preserved LV function per Dr. Patel bedside review, final results pending.   - Consider use of AV quintin blocker to control RVR post PPM.  - New diagnosis of AF. Patient is asymptomatic. [id number] score is 3 (age and gender). Discussed the risks and benefits of OAC with patient. Patient denies history of significant bleeding. Will initiate DOAC tomorrow post PPM. Currently maintaining NSR.     The risks, benefits, and alternatives to permanent pacemaker placement were discussed in great detail.    Specific risks mentioned to the patient including bleeding, cardiac perforation with possible tamponade possibly requiring pericardiocentesis or open heart surgery.    In addition the possibility of lead dislodgment (2-3%), pneumothorax (3%), hemothorax, infection were discussed.    Also, mentioned were the risk of death, stroke, and myocardial infarction.    The patient verbalized understanding of the potential complications and wishes to proceed with the procedure.      Thank you for allowing me to participate in the care of this patient.    Please see Dr. Gray's attestation for further additions and recommendations.     Please contact me with any  questions.    FAIZA Louie.   Cardiology, [facility]  [phone]      My total time spent caring for the patient on the day of the encounter was 22 minutes.     This does not include time spent on separately billable procedures/tests.

## 2022-11-02 NOTE — PROGRESS NOTES
"Hospital Medicine Daily Progress Note    Date of Service  11/2/2022    Chief Complaint  Lindsey Shell is a 86 y.o. female admitted 11/1/2022 with syncope    Hospital Course  85yo PMHx COPD, chronic hypoxic resp failure on 3.5 L HOT, DM, HTN, depression.  Resident of a SNF and sent to us after multiple syncopal episodes with up to 20 sec pauses.  Temp pacer placed in ED    Interval Problem Update  Pt states she's feeling \"OK\".  No CP, SOB, dizziness, palpitations, cold sweats, unusual back/neck/jaw or shoulder pain.  Is having her normal chronic joint pain    DW pt's family at bedside x 34mins    AFebrile  Paced/sinus 60  -110  On 4 LNC    I have discussed this patient's plan of care and discharge plan at IDT rounds today with Case Management, Nursing, Nursing leadership, and other members of the IDT team.    Consultants/Specialty  cardiology    Code Status  Full Code    Disposition  Patient is not medically cleared for discharge.   Anticipate discharge to to skilled nursing facility.  I have placed the appropriate orders for post-discharge needs.    Review of Systems  Review of Systems   Constitutional:  Negative for chills and fever.   HENT:  Negative for nosebleeds and sore throat.    Eyes:  Negative for blurred vision and double vision.   Respiratory:  Negative for cough and shortness of breath.    Cardiovascular:  Positive for leg swelling. Negative for chest pain and palpitations.   Gastrointestinal:  Negative for abdominal pain, diarrhea, nausea and vomiting.   Genitourinary:  Negative for dysuria and urgency.   Musculoskeletal:  Negative for back pain.   Skin:  Negative for rash.   Neurological:  Negative for dizziness, loss of consciousness and headaches.      Physical Exam  Pulse:  [60-67] 63  Resp:  [13-41] 16  BP: (105-124)/(56-64) 105/60  SpO2:  [96 %-98 %] 98 %    Physical Exam  Constitutional:       General: She is not in acute distress.     Appearance: She is well-developed. She is obese. She " is not diaphoretic.   HENT:      Head: Normocephalic and atraumatic.   Neck:      Vascular: No JVD.   Cardiovascular:      Rate and Rhythm: Normal rate and regular rhythm.      Heart sounds: Murmur heard.   Pulmonary:      Effort: Pulmonary effort is normal. No respiratory distress.      Breath sounds: No stridor. No wheezing or rales.   Abdominal:      Palpations: Abdomen is soft.      Tenderness: There is no abdominal tenderness. There is no guarding or rebound.   Musculoskeletal:         General: No tenderness.      Right lower leg: Edema present.      Left lower leg: Edema present.   Skin:     General: Skin is warm and dry.      Capillary Refill: Capillary refill takes less than 2 seconds.      Findings: No rash.   Neurological:      Mental Status: She is alert and oriented to person, place, and time.   Psychiatric:         Mood and Affect: Mood normal.         Behavior: Behavior normal.         Thought Content: Thought content normal.       Fluids    Intake/Output Summary (Last 24 hours) at 11/2/2022 0712  Last data filed at 11/2/2022 0439  Gross per 24 hour   Intake --   Output 1100 ml   Net -1100 ml       Laboratory  Recent Labs     11/01/22 2235 11/02/22  0105   WBC 5.8 5.6   RBC 3.25* 3.31*   HEMOGLOBIN 9.7* 9.7*   HEMATOCRIT 31.3* 31.6*   MCV 96.3 95.5   MCH 29.8 29.3   MCHC 31.0* 30.7*   RDW 46.8 46.0   PLATELETCT 127* 123*   MPV 10.5 10.2     Recent Labs     11/01/22 2235 11/02/22  0105   SODIUM 138 141   POTASSIUM 4.4 4.3   CHLORIDE 101 105   CO2 29 29   GLUCOSE 107* 107*   BUN 18 17   CREATININE 0.76 0.77   CALCIUM 8.8 8.5     Recent Labs     11/01/22 2235   APTT 33.1   INR 1.13               Imaging  EC-ECHOCARDIOGRAM COMPLETE W/O CONT   Final Result      CL-TEMPORARY PACEMAKER INSERT    (Results Pending)   CL-PERMANENT PACEMAKER INSERTION    (Results Pending)        Assessment/Plan  * Sinus arrest- (present on admission)  Assessment & Plan  Status post temporary transvenous pacemaker  Not on any  quintin blocking agents  No signficant e- abnormalities  Plan PPM    GERD (gastroesophageal reflux disease)  Assessment & Plan  Continue omeprazole    COPD (chronic obstructive pulmonary disease) ([facility])  Assessment & Plan  Oxygen dependent  Not in exacerbation  Continue supplemental oxygen  Albuterol as needed    Atrial fibrillation with RVR ([facility])  Assessment & Plan  New onset.  Cardioverted to sinus rhythm  Monitor on telemetry  Relatively benign TTE with preserved LVEF  CHADS/VASC 3: points for age and sex  Will hold off on starting anticoagulation, pending pacemaker placement       VTE prophylaxis: enoxaparin ppx    I have performed a physical exam and reviewed and updated ROS and Plan today (11/2/2022). In review of yesterday's note (11/1/2022), there are no changes except as documented above.

## 2022-11-02 NOTE — ASSESSMENT & PLAN NOTE
Status post temporary transvenous pacemaker  Not on any quintin blocking agents  No signficant e- abnormalities  PPM placed 11/2: interogated and working appropriately  Reviewed arm precautions with her

## 2022-11-02 NOTE — DISCHARGE PLANNING
Received Choice form at 1226  Agency[facility]Facility Name: [facility]  Referral sent per Choice form @ [phone]  [facility] Name: [facility]  Outcome: DPA left v-mail confirming Pt will be ready to return tomorrow. DPA requested call back    [phone]  Ag[facility] Name: [facility]  Outcome: DPA left v-mail asking for bed availability tomorrow. DPA requested call back

## 2022-11-02 NOTE — DIETARY
NUTRITION SERVICES: BMI - Pt with BMI >40 (=Body mass index is 52.07 kg/m².), Class III obesity. Weight loss counseling not appropriate in acute care setting. RECOMMEND - If appropriate at DC please refer to outpatient services for weight management.

## 2022-11-02 NOTE — ED TRIAGE NOTES
"Chief Complaint   Patient presents with    Syncope    Irregular Heart Beat     Pt BIB EMS as transfer from [facility]. Pt presented for recurrent syncopal episodes starting at 10 am this morning. On ED arrival pt found to be in a fib -140, EMS reports pt would joaquín down and experience long sinus pauses causing syncopal events. Pt received 150 amio, 100 fentanyl, 3 versed, and 1.5 L NS prior to arrival. Pt currently being demand paced at 60 bpm.     Pt reports hx of COPD on 3.5L NC.     /56   Pulse 65   Resp 16   Ht 1.549 m (5' 1\")   Wt 119 kg (263 lb)   SpO2 96%   BMI 49.69 kg/m²       "

## 2022-11-02 NOTE — H&P
Hospital Medicine History & Physical Note    Date of Service  11/2/2022    Primary Care Physician  Pcp Pt States None    Consultants  Interventional cardiology Dr. Zhao    Code Status  Full Code    Chief Complaint  Chief Complaint   Patient presents with    Syncope    Irregular Heart Beat       History of Presenting Illness  Lindsey Shell is a 86 y.o. female with past medical history of COPD, oxygen dependent on 3.5 L, anxiety and depression, who presented 11/1/2022 as a transfer from nursing home for evaluation of multiple syncopal episodes secondary to up to 20 seconds pulses and intermittent A. fib with RVR, without prior history of heart disease.  Patient arrived with transcutaneous pacer..  Cardiology consulted and patient undergone placement of temporary transvenous pacemaker.  Afterwards patient admitted to [facility].    I discussed the plan of care with patient.    Review of Systems  Review of Systems   Constitutional:  Negative for chills, fever and weight loss.   HENT:  Negative for ear pain, hearing loss and tinnitus.    Eyes:  Negative for blurred vision, double vision and photophobia.   Respiratory:  Negative for cough, hemoptysis and sputum production.    Cardiovascular:  Negative for chest pain, palpitations and orthopnea.   Gastrointestinal:  Negative for heartburn, nausea and vomiting.   Genitourinary:  Negative for dysuria, flank pain, frequency and hematuria.   Musculoskeletal:  Negative for back pain, joint pain and neck pain.   Skin:  Negative for itching and rash.   Neurological:  Positive for loss of consciousness. Negative for tremors, speech change, focal weakness and headaches.   Endo/Heme/Allergies:  Negative for environmental allergies and polydipsia. Does not bruise/bleed easily.   Psychiatric/Behavioral:  Negative for hallucinations and substance abuse. The patient is not nervous/anxious.      Past Medical History   has a past medical history of Chronic obstructive pulmonary disease  ([facility]).    Surgical History   has no past surgical history on file.     Family History     Family history reviewed with patient. There is no family history that is pertinent to the chief complaint.     Social History   reports that she has never smoked. She has never used smokeless tobacco. She reports that she does not currently use alcohol. She reports that she does not use drugs.    Allergies  Allergies   Allergen Reactions    Erythromycin Swelling     Hand swelling    Penicillins Swelling     Mouth, denies anaphylaxis     Sulfa Drugs Swelling     Mouth, denies anaphylaxis        Medications  None       Physical Exam  Pulse:  [65-67] 67  Resp:  [16] 16  BP: (114-124)/(56-60) 124/60  SpO2:  [96 %-97 %] 97 %  Blood Pressure : 124/60       Pulse: 67   Respiration: 16   Pulse Oximetry: 97 %       Physical Exam  Vitals and nursing note reviewed.   Constitutional:       General: She is not in acute distress.     Appearance: Normal appearance. She is obese.   HENT:      Head: Normocephalic and atraumatic.      Nose: Nose normal.      Mouth/Throat:      Mouth: Mucous membranes are moist.   Eyes:      Extraocular Movements: Extraocular movements intact.      Pupils: Pupils are equal, round, and reactive to light.   Cardiovascular:      Rate and Rhythm: Normal rate. Rhythm irregular.      Heart sounds: Murmur heard.   Pulmonary:      Effort: Pulmonary effort is normal.      Breath sounds: Normal breath sounds.   Abdominal:      General: Abdomen is flat. There is no distension.      Tenderness: There is no abdominal tenderness.   Musculoskeletal:         General: No swelling or deformity. Normal range of motion.      Cervical back: Normal range of motion and neck supple.   Skin:     General: Skin is warm and dry.   Neurological:      General: No focal deficit present.      Mental Status: She is alert and oriented to person, place, and time.   Psychiatric:         Mood and Affect: Mood normal.         Behavior: Behavior  normal.       Laboratory:  Recent Labs     11/01/22 2235   WBC 5.8   RBC 3.25*   HEMOGLOBIN 9.7*   HEMATOCRIT 31.3*   MCV 96.3   MCH 29.8   MCHC 31.0*   RDW 46.8   PLATELETCT 127*   MPV 10.5     Recent Labs     11/01/22 2235   SODIUM 138   POTASSIUM 4.4   CHLORIDE 101   CO2 29   GLUCOSE 107*   BUN 18   CREATININE 0.76   CALCIUM 8.8     Recent Labs     11/01/22 2235   ALTSGPT 19   ASTSGOT 24   ALKPHOSPHAT 137*   TBILIRUBIN 0.2   GLUCOSE 107*         No results for input(s): NTPROBNP in the last 72 hours.      Recent Labs     11/01/22 2235   TROPONINT 22*       Imaging:  CL-TEMPORARY PACEMAKER INSERT    (Results Pending)   EC-ECHOCARDIOGRAM COMPLETE W/O CONT    (Results Pending)           Assessment/Plan:  Justification for Admission Status  I anticipate this patient will require at least two midnights for appropriate medical management, necessitating inpatient admission because sinus arrest, new A. fib    Patient will need a Intermediate Care (Adult and Pediatrics) bed on MEDICAL service .  The need is secondary to sinus arrest, new A. fib.    * Sinus arrest- (present on admission)  Assessment & Plan  Status post temporary transvenous pacemaker  Preliminary plan for PPM versus ICD placement tomorrow, depending on transthoracic echo result  Monitoring on telemetry    Atrial fibrillation with RVR ([facility])  Assessment & Plan  New onset.  Cardioverted to sinus rhythm  Monitor on telemetry  Pending transthoracic echo  Will hold off on starting anticoagulation, pending pacemaker placement    COPD (chronic obstructive pulmonary disease) ([facility])  Assessment & Plan  Oxygen dependent  Not in exacerbation  Continue supplemental oxygen  Albuterol as needed      VTE prophylaxis: enoxaparin ppx

## 2022-11-02 NOTE — ED PROVIDER NOTES
"ED Provider Note    CHIEF COMPLAINT  Chief Complaint   Patient presents with    Syncope    Irregular Heart Beat       HPI  Lindsey Shell is a 86 y.o. female who presents via transfer for evaluation of multiple syncopal episodes corresponding to long sinus pauses, transferred to our facility with transcutaneous on demand pacing.  The patient states that this is never happened to her before, throughout the day today she kept passing out.  She has not had any chest pain outside of the transcutaneous pacing.  She did not been short of breath.  No leg pain or swelling.  At the outside facility she was found to be in a rapid A. fib with alternating pauses that by report were as long as 20 seconds.  She was treated with amiodarone and during the transport was provided with some midazolam to facilitate the transcutaneous pacing.    REVIEW OF SYSTEMS  Negative for fever, rash, abdominal pain, nausea, vomiting, diarrhea, headache, focal weakness, focal numbness, focal tingling, back pain. All other systems are negative.     PAST MEDICAL HISTORY  Chronic pain  COPD  Dyslipidemia  Depression  Obesity  Oxygen dependent    FAMILY HISTORY  No family history on file.    SOCIAL HISTORY       SURGICAL HISTORY  No past surgical history on file.    CURRENT MEDICATIONS  I personally reviewed the medication list in the charting documentation.     ALLERGIES  Allergies   Allergen Reactions    Erythromycin Swelling     Hand swelling    Penicillins Swelling     Mouth, denies anaphylaxis     Sulfa Drugs Swelling     Mouth, denies anaphylaxis        MEDICAL RECORD  I have reviewed patient's medical record and pertinent results are listed above.      PHYSICAL EXAM  VITAL SIGNS: /60   Pulse 67   Resp 16   Ht 1.549 m (5' 1\")   Wt 119 kg (263 lb)   SpO2 97%   BMI 49.69 kg/m²    Constitutional: Well appearing patient in no acute distress.  Awake and alert, not toxic nor ill in appearance.  HENT: Normocephalic, no obvious evidence of " acute trauma.  Eyes: No scleral icterus. Normal conjunctiva   Neck: Comfortable movement without any obvious restriction in the range of motion.  Cardiovascular: On auscultation has intermittent regular heart rate with periods of pauses  Thorax & Lungs: Normal nonlabored respirations.  Upon application of the stethoscope for auscultation I find there to be no associated chest wall tenderness.  I appreciate no wheezing, rhonchi or rales. There is normal air movement.    Abdomen: The abdomen is not visibly distended. Upon palpation, I find it to be without tenderness.  No mass appreciated.  Skin: The exposed portions of skin reveal no obvious rash or other abnormalities.  Extremities/Musculoskeletal: Symmetric lower extremity edema  Neurologic: Alert & oriented. No focal deficits observed.   Psychiatric: Normal affect appropriate for the clinical situation.    DIAGNOSTIC STUDIES / PROCEDURES    LABS/EKGs     Results for orders placed or performed during the hospital encounter of 11/01/22   CBC WITH DIFFERENTIAL   Result Value Ref Range    WBC 5.8 4.8 - 10.8 K/uL    RBC 3.25 (L) 4.20 - 5.40 M/uL    Hemoglobin 9.7 (L) 12.0 - 16.0 g/dL    Hematocrit 31.3 (L) 37.0 - 47.0 %    MCV 96.3 81.4 - 97.8 fL    MCH 29.8 27.0 - 33.0 pg    MCHC 31.0 (L) 33.6 - 35.0 g/dL    RDW 46.8 35.9 - 50.0 fL    Platelet Count 127 (L) 164 - 446 K/uL    MPV 10.5 9.0 - 12.9 fL    Neutrophils-Polys 68.00 44.00 - 72.00 %    Lymphocytes 22.40 22.00 - 41.00 %    Monocytes 7.60 0.00 - 13.40 %    Eosinophils 1.20 0.00 - 6.90 %    Basophils 0.30 0.00 - 1.80 %    Immature Granulocytes 0.50 0.00 - 0.90 %    Nucleated RBC 0.00 /100 WBC    Neutrophils (Absolute) 3.91 2.00 - 7.15 K/uL    Lymphs (Absolute) 1.29 1.00 - 4.80 K/uL    Monos (Absolute) 0.44 0.00 - 0.85 K/uL    Eos (Absolute) 0.07 0.00 - 0.51 K/uL    Baso (Absolute) 0.02 0.00 - 0.12 K/uL    Immature Granulocytes (abs) 0.03 0.00 - 0.11 K/uL    NRBC (Absolute) 0.00 K/uL   EKG   Result Value Ref Range     Report       [facility] Emergency Dept.    Test Date:  2022  Pt Name:    PAT OMER                Department: ER  MRN:        [id number]                      Room:       RD 01  Gender:     Female                       Technician: [id number]  :        [id number]                   Requested By:PRATIK DAWKINS  Order #:    [id number]                    Reading MD: PRATIK DAWKINS MD    Measurements  Intervals                                Axis  Rate:       86                           P:          0  WA:         59                           QRS:        15  QRSD:       135                          T:          46  QT:         437  QTc:        523    Interpretive Statements  12 Lead EKG interpreted by me to show: -- Rate 86 -- Rhythm: Normal sinus  rhythm  -- Axis: Normal -- WA and QRS Intervals: Normal -- T waves: No acute changes  --  ST segments: No acute changes -- Ectopy: None. My impression of this EKG:  Does  not indicate acute ischemia at this time.  Electronically Sign ed On 2022 23:07:53 PDT by PRATIK DAWKINS MD          RADIOLOGY     CL-TEMPORARY PACEMAKER INSERT    (Results Pending)           Cardiac Pacing Procedure Note    Indication: Periods of sinus arrest with syncope    Consent: The patient provided verbal consent for this procedure.    Pre-Medication: midazolam (Versed) during prehospital transport    Procedure: The patient was placed in the supine position and the chest area was exposed. The cardiac pacing pads were applied in the standard manner and configuration. The voltage was placed at 60 mA and at a rate of 60 beats per minute and a demand style of pacing.  Capture was obtained.    The patient tolerated the procedure well.    Complications: None         COURSE & MEDICAL DECISION MAKING  I have reviewed any medical record information, laboratory studies and radiographic results as noted above.    Encounter Summary: This is a very pleasant 86 y.o. female who  unfortunately required evaluation in the emergency department today with long periods of sinus arrest correlating with episodes of syncope, was transferred from outside facility where transcutaneous pacing was initiated.  Upon arrival here we transferred her to our cardiac monitor and paced her in demand fashion with good capture as needed.  Emergently consulted cardiology who will take the patient to the Cath Lab for a temp pacer.  In the meantime blood work is obtained, ultimately the patient will be admitted to the hospital for further evaluation and care    CRITICAL CARE  The very real possibilty of a deterioration of this patient's condition required the highest level of my preparedness for sudden, emergent intervention.  I provided critical care services, which included medication orders, frequent reevaluations of the patient's condition and response to treatment, ordering and reviewing test results, and discussing the case with various consultants.  The critical care time associated with the care of the patient was 39 minutes. Review chart for interventions. This time is exclusive of any other billable procedures.     DISPOSITION: Admit in guarded condition      FINAL IMPRESSION  1. Sinus pause    2. Syncope, unspecified syncope type           This dictation was created using voice recognition software. The accuracy of the dictation is limited to the abilities of the software. I expect there may be some errors of grammar and possibly content. The nursing notes were reviewed and certain aspects of this information were incorporated into this note.    Electronically signed by: Harsha Chowdhury M.D., 11/1/2022 10:46 PM

## 2022-11-02 NOTE — DISCHARGE PLANNING
Case Management Discharge Planning    Admission Date: 11/1/2022  GMLOS:    ALOS: 1    6-Clicks ADL Score:    6-Clicks Mobility Score:        Anticipated Discharge Dispo: Discharge Disposition: D/T to SNF with Medicare cert in anticipation of skilled care (03)    DME Needed: No    Action(s) Taken: DC Assessment Complete (See below); KANDY updated provided that pt has POLST on file indicating DNR status.     Medically Clear: No    Next Steps: Await further medical work-up/intervention; HCM team to obtain SNF choice once orders placed. Anticipate return to [facility] when medically clear.    Update 1225: Per MD, pt likely will be clear tomorrow for return to SNF. KANDY called pt's dtr Sharri and obtained consent to send choice for [facility]. Pt is a long term resident there, per dtr. KANDY sent choice to [facility]. Pt is wheelchair bound and likely will need medical transport to return.     Barriers to Discharge: Medical clearance, Pending Placement, Pending PT Evaluation, and Transportation    Is the patient up for discharge tomorrow: No    Care Transition Team Assessment    Pt transferred from [facility] ER, where she was transferred to from [facility]. Pt has a dtr, Sharri, for support and emergency contact ([phone]). Pt on 3.5L O2 at baseline. Pt does not have an AHCD on file, however, does have a POLST indicating DNR w/ selective treatment.     Information Source  Who is responsible for making decisions for patient? : Patient    Readmission Evaluation  Is this a readmission?: No    Elopement Risk  Legal Hold: No  Ambulatory or Self Mobile in Wheelchair: No-Not an Elopement Risk      Discharge Preparedness  What is your plan after discharge?: Skilled nursing facility  What are your discharge supports?: Child  Prior Functional Level: Needs Assist with Activities of Daily Living, Needs Assist with Medication Management    Functional Assesment  Prior Functional Level: Needs Assist with  Activities of Daily Living, Needs Assist with Medication Management    Finances  Financial Barriers to Discharge: No  Prescription Coverage: Yes      Advance Directive  Advance Directive?: POLST    Domestic Abuse  Have you ever been the victim of abuse or violence?: No    Psychological Assessment  History of Substance Abuse: None  History of Psychiatric Problems: No  Non-compliant with Treatment: No    Discharge Risks or Barriers  Discharge risks or barriers?: No  Patient risk factors: Vulnerable adult    Anticipated Discharge Information  Discharge Disposition: D/T to SNF with Medicare cert in anticipation of skilled care (03)

## 2022-11-02 NOTE — PROCEDURES
Temporary transvenous pacemaker insertion    : Richard Saleh MD, MPH    INDICATION:  Sinus arrest with syncope    SEDATION AND DURATION:  Conscious sedation was administered by trained personnel, supervised by myself between 2352 and 0002 using fentanyl and Versed.  The patient tolerated sedation well.    PROCEDURE DESCRIPTION:  The superficial tissue overlying the right neck was prepped and draped in usual sterile fashion. Subsequently, the area was infiltrated with lidocaine solution. Next, the right internal jugular vein was cannulated using dynamic ultrasound and micropuncture technique.  I then introduced a 6 Singaporean sheath and floated a 5 Singaporean balloon tipped temporary pacemaker catheter into the right ventricle.  Appropriate pacing and sensing parameters were obtained and settings are as follows ( backup rate at 60 bpm, 5mA, 0.5mV).    EBL:   None    Complications:   None apparent    IMPRESSION:  1. Successful insertion of temporary, transvenous pacemaker, secured at 40 cm. Set at VVI 60 bpm, 5mA. Sheath sutured.    Dr. Melo -admitting [facility] hospitalist - updated.    NOTIFICATION:  The patient's family were updated in the waiting area.    Richard Saleh MD, MPH [facility]  Interventional Cardiologist  [facility]   of Clinical Internal Medicine - [facility]

## 2022-11-02 NOTE — OP REPORT
Electrophysiology Procedure Note  [facility]    PROCEDURE PERFORMED: Dual-chamber pacemaker, moderate sedation administered by RN and supervised by physician    : Salbador Gray MD    ASSISTANT: none    ANESTHESIA: Moderate sedation,  start time 1334, stop time 1414  the moderate sedation document has been reviewed, signed and scanned into media     EBL: 30 cc    SPECIMENS: None    INDICATION: Sick sinus syndrome    PRE PROCEDURE ECG: Sinus bradycardia    POST PROCEDURE ECG: Atrial pacing     COMPLICATIONS: None    DESCRIPTION OF PROCEDURE:  After informed written consent, the patient was brought to the electrophysiology lab in the fasting, unsedated state. The patient was prepped and draped in the usual sterile fashion. The procedure was performed under moderate sedation with local anesthetic.   A left infraclavicular incision was made with a scalpel and the pectoral device pocket was created using a combination of blunt dissection and electrocautery. The modified Seldinger technique was used to gain access to the left axillary vein times two. Two peel-away hemostasis sheaths were placed in the vein. Under fluoroscopic guidance, the pacemaker leads were introduced into the heart. The ventricular lead was advanced to the RVOT and then lowered into position at the RV apex. The atrial lead was positioned in the right atrial appendage. The leads were fixated in normal mechanism. The leads were tested and had satisfactory sensing and pacing parameters. High output ventricular pacing did not produce extracardiac stimulation. The leads were sutured to the underlying pectoral muscle with interrupted non absorbable suture over a silastic suture sleeve. The device pocket was irrigated with antibiotic solution, inspected, and no bleeding was seen. The leads were connected to the dual chamber pacemaker pulse generator and the device was inserted into the pocket The wound was closed with three layers  of absorbable sutures.  I personally supervised the administration of moderate sedation by the RN and observed the level of consciousness and physiologic status throughout the procedure.   Following recovery from sedation, the patient was transferred to a monitored bed.    IMPLANTED DEVICE INFORMATION:  Pulse generator is a Medtronic model W3DR01  Serial # [id number]    LEAD INFORMATION:  1)Right atrial lead is a Medtronic model #5076-52, serial # [id number],P wave 1.8 millivolts, threshold 1.75 volts, pacing impedance 665 ohms.    2)Right ventricular lead is a Medtronic model #5076-58, serial # [id number],R wave 6 millivolts, threshold 0.5 volts, pacing impedance 1159 ohms.    DEVICE PROGRAMMING:  AAIR to DDDR    FLUOROSCOPY TIME: 1.5 minutes, 23 mGy    IMPRESSIONS:  1. Successful dual-chamber pacemaker implantation    RECOMMENDATIONS:  1. Transfer to monitored bed  2. Chest x-ray  3. Device interrogation prior to hospital discharge  4. Followup in device clinic

## 2022-11-03 ENCOUNTER — APPOINTMENT (OUTPATIENT)
Dept: RADIOLOGY | Facility: MEDICAL CENTER | Age: 87
DRG: 243 | End: 2022-11-03
Attending: INTERNAL MEDICINE
Payer: MEDICARE

## 2022-11-03 PROCEDURE — 97166 OT EVAL MOD COMPLEX 45 MIN: CPT

## 2022-11-03 PROCEDURE — 700102 HCHG RX REV CODE 250 W/ 637 OVERRIDE(OP): Performed by: HOSPITALIST

## 2022-11-03 PROCEDURE — 71045 X-RAY EXAM CHEST 1 VIEW: CPT

## 2022-11-03 PROCEDURE — 97162 PT EVAL MOD COMPLEX 30 MIN: CPT

## 2022-11-03 PROCEDURE — A9270 NON-COVERED ITEM OR SERVICE: HCPCS | Performed by: HOSPITALIST

## 2022-11-03 PROCEDURE — 700111 HCHG RX REV CODE 636 W/ 250 OVERRIDE (IP): Performed by: INTERNAL MEDICINE

## 2022-11-03 PROCEDURE — 700101 HCHG RX REV CODE 250: Performed by: INTERNAL MEDICINE

## 2022-11-03 PROCEDURE — 700105 HCHG RX REV CODE 258: Performed by: INTERNAL MEDICINE

## 2022-11-03 PROCEDURE — 700102 HCHG RX REV CODE 250 W/ 637 OVERRIDE(OP): Performed by: INTERNAL MEDICINE

## 2022-11-03 PROCEDURE — 770020 HCHG ROOM/CARE - TELE (206)

## 2022-11-03 PROCEDURE — A9270 NON-COVERED ITEM OR SERVICE: HCPCS | Performed by: INTERNAL MEDICINE

## 2022-11-03 PROCEDURE — 99232 SBSQ HOSP IP/OBS MODERATE 35: CPT | Performed by: NURSE PRACTITIONER

## 2022-11-03 RX ADMIN — RIVAROXABAN 20 MG: 20 TABLET, FILM COATED ORAL at 17:37

## 2022-11-03 RX ADMIN — LIDOCAINE 2 PATCH: 50 PATCH CUTANEOUS at 05:39

## 2022-11-03 RX ADMIN — LIDOCAINE: 50 OINTMENT TOPICAL at 05:38

## 2022-11-03 RX ADMIN — ATORVASTATIN CALCIUM 10 MG: 10 TABLET, FILM COATED ORAL at 05:37

## 2022-11-03 RX ADMIN — DOCUSATE SODIUM 100 MG: 50 LIQUID ORAL at 17:38

## 2022-11-03 RX ADMIN — GABAPENTIN 100 MG: 100 CAPSULE ORAL at 05:35

## 2022-11-03 RX ADMIN — OXYBUTYNIN CHLORIDE 5 MG: 5 TABLET ORAL at 05:37

## 2022-11-03 RX ADMIN — DOCUSATE SODIUM 100 MG: 50 LIQUID ORAL at 05:37

## 2022-11-03 RX ADMIN — SERTRALINE 200 MG: 100 TABLET, FILM COATED ORAL at 05:36

## 2022-11-03 RX ADMIN — HYDROCODONE BITARTRATE AND ACETAMINOPHEN 0.5 TABLET: 5; 325 TABLET ORAL at 17:41

## 2022-11-03 RX ADMIN — BUMETANIDE 1 MG: 1 TABLET ORAL at 07:19

## 2022-11-03 RX ADMIN — CEFAZOLIN 2 G: 2 INJECTION, POWDER, FOR SOLUTION INTRAMUSCULAR; INTRAVENOUS at 00:49

## 2022-11-03 RX ADMIN — GABAPENTIN 100 MG: 100 CAPSULE ORAL at 17:37

## 2022-11-03 RX ADMIN — POTASSIUM CHLORIDE 20 MEQ: 20 TABLET, EXTENDED RELEASE ORAL at 05:36

## 2022-11-03 RX ADMIN — SENNOSIDES AND DOCUSATE SODIUM 2 TABLET: 50; 8.6 TABLET ORAL at 17:37

## 2022-11-03 RX ADMIN — OMEPRAZOLE 20 MG: 20 CAPSULE, DELAYED RELEASE ORAL at 05:35

## 2022-11-03 RX ADMIN — SENNOSIDES AND DOCUSATE SODIUM 2 TABLET: 50; 8.6 TABLET ORAL at 05:36

## 2022-11-03 ASSESSMENT — COGNITIVE AND FUNCTIONAL STATUS - GENERAL
MOVING FROM LYING ON BACK TO SITTING ON SIDE OF FLAT BED: A LITTLE
WALKING IN HOSPITAL ROOM: TOTAL
MOBILITY SCORE: 14
SUGGESTED CMS G CODE MODIFIER MOBILITY: CL
TOILETING: A LOT
DRESSING REGULAR UPPER BODY CLOTHING: A LOT
CLIMB 3 TO 5 STEPS WITH RAILING: TOTAL
HELP NEEDED FOR BATHING: A LOT
SUGGESTED CMS G CODE MODIFIER DAILY ACTIVITY: CK
TURNING FROM BACK TO SIDE WHILE IN FLAT BAD: A LITTLE
MOVING TO AND FROM BED TO CHAIR: A LITTLE
EATING MEALS: A LITTLE
STANDING UP FROM CHAIR USING ARMS: A LITTLE
DRESSING REGULAR LOWER BODY CLOTHING: A LOT
PERSONAL GROOMING: A LITTLE
DAILY ACTIVITIY SCORE: 14

## 2022-11-03 ASSESSMENT — ACTIVITIES OF DAILY LIVING (ADL): TOILETING: REQUIRES ASSIST

## 2022-11-03 ASSESSMENT — CHA2DS2 SCORE
CHA2DS2 VASC SCORE: 4
AGE 75 OR GREATER: YES
HYPERTENSION: YES
SEX: FEMALE
PRIOR STROKE OR TIA OR THROMBOEMBOLISM: NO
CHF OR LEFT VENTRICULAR DYSFUNCTION: NO
AGE 65 TO 74: NO
VASCULAR DISEASE: NO
DIABETES: NO

## 2022-11-03 ASSESSMENT — ENCOUNTER SYMPTOMS
PALPITATIONS: 0
HEADACHES: 0
DIARRHEA: 0
NAUSEA: 0
VOMITING: 0
CHEST TIGHTNESS: 0
COUGH: 0
BLOOD IN STOOL: 0
CHILLS: 0
FEVER: 0
DOUBLE VISION: 0
DIZZINESS: 0
BLURRED VISION: 0
LOSS OF CONSCIOUSNESS: 0
BACK PAIN: 0
SHORTNESS OF BREATH: 0
ABDOMINAL PAIN: 0

## 2022-11-03 ASSESSMENT — FIBROSIS 4 INDEX: FIB4 SCORE: 3.58

## 2022-11-03 ASSESSMENT — PAIN DESCRIPTION - PAIN TYPE
TYPE: CHRONIC PAIN

## 2022-11-03 ASSESSMENT — GAIT ASSESSMENTS: GAIT LEVEL OF ASSIST: UNABLE TO PARTICIPATE

## 2022-11-03 NOTE — DISCHARGE INSTRUCTIONS
Pacemaker Discharge Instructions/[facility]    1.  No showers until seen in follow up; may take sponge bath.  Keep dressing dry & in place until seen at for you follow up visit at the cardiology office.     2.  No lifting over 10 lbs with affected arm for six weeks.  3.  Do not raise affected arm above shoulder level or behind head for six weeks.  4.  Avoid excessive pushing, pulling, or twisting for six weeks.  5.  No driving for the first week.  6.  Call our office ([phone]) if you notice any increased swelling, redness, warmth, or drainage at the implant site.  7.  Needs to be seen in emergency if you develop fever > 101F or uncontrolled pain.  8.  Always check with device clinic before any planned MRI to see if device is MRI compatible.  9.  No routine dental work or cleanings for 3 months.  10.  May remove arm sling after one day, but please wear if you have trouble remembering to keep your arm down.  Please wear at night as a reminder.   11. Do not place cell phones or mobile devices directly over implanted device.       Diet  Heart Healthy Diet  A Heart-Healthy diet includes increasing healthy fats and limiting unhealthy fats.  Focus on eating a balance of foods, including fruits and vegetables, low-fat or nonfat dairy, lean protein, nuts and legumes, whole grains, and heart-healthy oils and fats.  Monitor your salt (sodium) intake, especially if you have high blood pressure.  Lose weight if you are overweight. Losing just 5-10% of your body weight can help your overall health and prevent diseases such as diabetes and heart disease.

## 2022-11-03 NOTE — PROGRESS NOTES
Bedside report received from previous RN, pt care assumed, assessment completed. Pt is A&O 4, declines pain management for leg arthritis at this time, partially paced in the 60s on the monitor. Updated on POC, questions answered. Bed in lowest, locked position, treaded socks on, call light and belongings within reach. Fall precautions in place.

## 2022-11-03 NOTE — PROGRESS NOTES
Hospital Medicine Daily Progress Note    Date of Service  11/3/2022    Chief Complaint  Lindsey Shell is a 86 y.o. female admitted 11/1/2022 with syncope    Hospital Course  85yo PMHx COPD, chronic hypoxic resp failure on 3.5 L HOT, DM, HTN, depression.  Resident of a SNF and sent to us after multiple syncopal episodes with up to 20 sec pauses.  Temp pacer placed in ED.  PPM 11/2    Interval Problem Update  Pt states she's feeling good today.  Some soreness at surgical site but otherwise no complaints  Sinus/paced overnight on monitor    DW pt's family at bedside x 34mins    AFebrile  Paced/sinus 60  -110  On 4 LNC    I have discussed this patient's plan of care and discharge plan at IDT rounds today with Case Management, Nursing, Nursing leadership, and other members of the IDT team.    Consultants/Specialty  cardiology    Code Status  Full Code    Disposition  Patient is not medically cleared for discharge.   Anticipate discharge to to skilled nursing facility.  I have placed the appropriate orders for post-discharge needs.    Review of Systems  Review of Systems   Constitutional:  Negative for chills and fever.   Eyes:  Negative for blurred vision and double vision.   Respiratory:  Negative for cough and shortness of breath.    Cardiovascular:  Positive for leg swelling. Negative for chest pain and palpitations.   Gastrointestinal:  Negative for abdominal pain, diarrhea, nausea and vomiting.   Genitourinary:  Negative for dysuria and urgency.   Musculoskeletal:  Negative for back pain.   Skin:  Negative for rash.   Neurological:  Negative for dizziness, loss of consciousness and headaches.      Physical Exam  Temp:  [36.8 °C (98.2 °F)-37.3 °C (99.1 °F)] 36.8 °C (98.2 °F)  Pulse:  [60-82] 60  Resp:  [11-32] 17  BP: ()/(46-78) 97/46  SpO2:  [87 %-98 %] 95 %    Physical Exam  Constitutional:       General: She is not in acute distress.     Appearance: She is well-developed. She is obese. She is not  diaphoretic.   HENT:      Head: Normocephalic and atraumatic.   Neck:      Vascular: No JVD.   Cardiovascular:      Rate and Rhythm: Normal rate and regular rhythm.      Heart sounds: Murmur heard.   Pulmonary:      Effort: Pulmonary effort is normal. No respiratory distress.      Breath sounds: No stridor. No wheezing or rales.      Comments: Post op dressing noted  Abdominal:      Palpations: Abdomen is soft.      Tenderness: There is no abdominal tenderness. There is no guarding or rebound.   Musculoskeletal:         General: No tenderness.      Right lower leg: Edema present.      Left lower leg: Edema present.   Skin:     General: Skin is warm and dry.      Capillary Refill: Capillary refill takes less than 2 seconds.      Findings: No rash.      Comments: Arm N/V status intact   Neurological:      Mental Status: She is alert and oriented to person, place, and time. Mental status is at baseline.   Psychiatric:         Mood and Affect: Mood normal.         Behavior: Behavior normal.         Thought Content: Thought content normal.       Fluids    Intake/Output Summary (Last 24 hours) at 11/3/2022 1244  Last data filed at 11/3/2022 0900  Gross per 24 hour   Intake 200 ml   Output 2100 ml   Net -1900 ml         Laboratory  Recent Labs     11/01/22 2235 11/02/22  0105   WBC 5.8 5.6   RBC 3.25* 3.31*   HEMOGLOBIN 9.7* 9.7*   HEMATOCRIT 31.3* 31.6*   MCV 96.3 95.5   MCH 29.8 29.3   MCHC 31.0* 30.7*   RDW 46.8 46.0   PLATELETCT 127* 123*   MPV 10.5 10.2       Recent Labs     11/01/22 2235 11/02/22  0105   SODIUM 138 141   POTASSIUM 4.4 4.3   CHLORIDE 101 105   CO2 29 29   GLUCOSE 107* 107*   BUN 18 17   CREATININE 0.76 0.77   CALCIUM 8.8 8.5       Recent Labs     11/01/22 2235   APTT 33.1   INR 1.13                 Imaging  DX-CHEST-PORTABLE (1 VIEW)   Final Result         1.  Interstitial pulmonary parenchymal prominence suggest chronic underlying lung disease, component of interstitial edema and/or infiltrates  not excluded.   2.  Trace bilateral pleural effusions   3.  Cardiomegaly   4.  Atherosclerosis      DX-CHEST-PORTABLE (1 VIEW)   Final Result      1.  No acute cardiac or pulmonary abnormalities are identified.      2.  There is a left sided pacemaker in good position with its distal leads overlying the right atrium and right ventricle.      EC-ECHOCARDIOGRAM COMPLETE W/O CONT   Final Result      CL-TEMPORARY PACEMAKER INSERT    (Results Pending)   CL-PERMANENT PACEMAKER INSERTION    (Results Pending)          Assessment/Plan  * Sinus arrest- (present on admission)  Assessment & Plan  Status post temporary transvenous pacemaker  Not on any quintin blocking agents  No signficant e- abnormalities  PPM placed 11/2: interogated and working appropriately  Reviewed arm precautions with her    GERD (gastroesophageal reflux disease)  Assessment & Plan  Continue omeprazole    COPD (chronic obstructive pulmonary disease) ([facility])  Assessment & Plan  Oxygen dependent  Not in exacerbation  Continue supplemental oxygen  Albuterol as needed    Atrial fibrillation with RVR ([facility])  Assessment & Plan  New onset.  Cardioverted to sinus rhythm  Monitor on telemetry  Relatively benign TTE with preserved LVEF  CHADS/VASC 3: points for age and sex  Start DOAC       VTE prophylaxis: enoxaparin ppx    I have performed a physical exam and reviewed and updated ROS and Plan today (11/3/2022). In review of yesterday's note (11/2/2022), there are no changes except as documented above.

## 2022-11-03 NOTE — DISCHARGE SUMMARY
Discharge Summary    CHIEF COMPLAINT ON ADMISSION  Chief Complaint   Patient presents with    Syncope    Irregular Heart Beat       Reason for Admission  ems    Admission Date  11/1/2022     CODE STATUS  Full Code    HPI & HOSPITAL COURSE  This is a 86 y.o. 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  She has previous medical history of COPD, BMI of 52.    Patient was sent to an outlying emergency room from the skilled nursing facility after having had syncopal events.  There she was found to have A. fib RVR, and also was noted to have 1/22 sinus arrest.  For these reasons, she was sent to us.  On arrival here, patient had a transfer cutaneous pacer placed in the emergency room with good capture and resolution of the patient's symptoms.  She was then transferred to the intermediate care unit with cardiology consultation.    Patient went for permanent pacemaker on November 2.  The device is a Medtronic model W3 DR 01 serial number [id number] and [id number] details as noted below.    Post procedure the patient has done well.  Device has been interrogated on the day following implantation, and results are favorable.  Surgical site looks good.  Patient is neurologically vascularly intact in the involved upper extremity.  While here she also had a cardiac echo done also on November 2.  This demonstrated normal left ventricular systolic function, severe mitral annular calcification with mild mitral stenosis.  Right ventricular systolic pressures of 38 mmHg.  The remainder of the study was benign.  Labs demonstrate an anemia of 9.7 which was stable.  Electrolytes, renal function, and liver markers were all unremarkable.    At this point we are discharging the patient home.  Given her atrial fibrillation, and [id number] score of 3 then we will be discharging her on rivaroxaban.  She has been rate controlled since patent placement of the pacemaker so  we will not be discharging her on any rate control medications however if she were to develop a tachycardia, then we would recommend initiation of a beta-blocker ideally metoprolol or Coreg.    Another issue of concern is the patient's use of lidocaine transdermally for pain in her hands and knees.  It is unlikely that this contributed to her cardiac dysrhythmia, as to have had that degree of toxicity, she would most likely have had neurologic symptoms as well which she did not demonstrate other than her syncope.  It is concerning however that she is using 2 patches and apparently use the emollient for her hands on quite frequent basis.  We would recommend decreasing her lidocaine exposure as a result.    Cardiology has discussed follow-up issues with the patient's physician assistant who cares for her at the [facility].    IMPLANTED DEVICE INFORMATION:  Pulse generator is a [employer] model W3DR01  Serial # [id number]     LEAD INFORMATION:  1)Right atrial lead is a Medtronic model #5076-52, serial # [id number],P wave 1.8 millivolts, threshold 1.75 volts, pacing impedance 665 ohms.     2)Right ventricular lead is a Medtronic model #5076-58, serial # [id number],R wave 6 millivolts, threshold 0.5 volts, pacing impedance 1159 ohms.     DEVICE PROGRAMMING:  AAIR to DDDR     No notes on file    Therefore, she is discharged in good and stable condition to home with close outpatient follow-up.    The patient met 2-midnight criteria for an inpatient stay at the time of discharge.        FOLLOW UP ITEMS POST DISCHARGE  With cardiology    DISCHARGE DIAGNOSES  Principal Problem:    Sinus arrest POA: Yes  Active Problems:    Bradycardia POA: Yes    Atrial fibrillation with RVR ([facility]) POA: Unknown    COPD (chronic obstructive pulmonary disease) ([facility]) POA: Unknown    GERD (gastroesophageal reflux disease) POA: Unknown  Resolved Problems:    * No resolved hospital problems. *      FOLLOW UP  Future Appointments    Date Time Provider Department Center   11/10/2022  1:00 PM PACER CHECK-[facility] None     No follow-up provider specified.    MEDICATIONS ON DISCHARGE     Medication List        START taking these medications        Instructions   rivaroxaban 20 MG Tabs tablet  Commonly known as: XARELTO   Take 1 Tablet by mouth with dinner.  Dose: 20 mg            CHANGE how you take these medications        Instructions   lidocaine 5 % Oint  What changed: Another medication with the same name was removed. Continue taking this medication, and follow the directions you see here.  Commonly known as: XYLOCAINE   Apply 1 g topically 3 times a day. Apply to both hands  Dose: 1 g            CONTINUE taking these medications        Instructions   atorvastatin 10 MG Tabs  Commonly known as: LIPITOR   Take 10 mg by mouth every day.  Dose: 10 mg     bumetanide 1 MG Tabs  Commonly known as: BUMEX   Take 1 mg by mouth every day. Hold if SBP < 100 pr DBP < 60  Dose: 1 mg     CALCIUM CARB-CHOLECALCIFEROL PO   Take 1 Tablet by mouth 2 times a day with meals.  Dose: 1 Tablet     celecoxib 100 MG Caps  Commonly known as: CELEBREX   Take 100 mg by mouth 2 times a day.  Dose: 100 mg     Docusate Sodium 100 MG Tabs   Take 100 mg by mouth 2 times a day.  Dose: 100 mg     gabapentin 100 MG Caps  Commonly known as: NEURONTIN   Take 100 mg by mouth 2 times a day.  Dose: 100 mg     HYDROcodone-acetaminophen 5-325 MG Tabs per tablet  Commonly known as: NORCO   Take 0.5-1 Tablets by mouth 3 times a day. 0.5 tablet in the morning and afternoon  1 tablet in the evening  Dose: 0.5-1 Tablet     omeprazole 20 MG delayed-release capsule  Commonly known as: PRILOSEC   Take 20 mg by mouth every day.  Dose: 20 mg     polyethylene glycol/lytes 17 g Pack  Commonly known as: MIRALAX   Take 17 g by mouth every day.  Dose: 17 g     potassium chloride 20 MEQ Pack  Commonly known as: KLOR-CON   Take 20 mEq by mouth 2 times a day.  Dose: 20 mEq     sertraline 50 MG  Tabs  Commonly known as: Zoloft   Take 100 mg by mouth every day. 2 tablets = 100 mg  Dose: 100 mg     therapeutic multivitamin-minerals Tabs   Take 1 Tablet by mouth every day.  Dose: 1 Tablet     tolterodine ER 2 MG Cp24  Commonly known as: DETROL-LA   Take 2 mg by mouth every day.  Dose: 2 mg              Allergies  Allergies   Allergen Reactions    Erythromycin Swelling     Hand swelling    Penicillins Swelling     Mouth, denies anaphylaxis     Sulfa Drugs Swelling     Mouth, denies anaphylaxis        DIET  Orders Placed This Encounter   Procedures    Diet Order Diet: Cardiac     Standing Status:   Standing     Number of Occurrences:   1     Order Specific Question:   Diet:     Answer:   Cardiac [6]       ACTIVITY  As tolerated.  Weight bearing as tolerated    LINES, DRAINS, AND WOUNDS  This is an automated list. Peripheral IVs will be removed prior to discharge.  Peripheral IV 11/02/22 18 G Anterior;Left Forearm (Active)   Site Assessment Clean;Dry;Intact 11/03/22 0200   Dressing Type Transparent 11/03/22 0200   Line Status Saline locked 11/03/22 0200   Dressing Status Clean;Dry;Intact 11/03/22 0200   Dressing Intervention N/A 11/03/22 0200   Infiltration Grading ([facility]) 0 11/03/22 0200   Phlebitis Scale ([facility] Only) 0 11/03/22 0200       Peripheral IV 11/02/22 20 G Left;Posterior Hand (Active)   Site Assessment Clean;Dry;Intact 11/03/22 0200   Dressing Type Transparent 11/03/22 0200   Line Status Saline locked 11/03/22 0200   Dressing Status Clean;Dry;Intact 11/03/22 0200   Dressing Intervention N/A 11/03/22 0200   Infiltration Grading ([facility]) 0 11/03/22 0200   Phlebitis Scale ([facility] Only) 0 11/03/22 0200     Urethral Catheter (Active)   Site Assessment Clean;Skin intact 11/03/22 0900   Collection Container Standard drainage bag 11/03/22 0900   Urinary Catheter Care Drainage Tube Extended;Drainage Tubing Properly Secured;Drainage Bag Below Bladder Level and Not on Floor 11/03/22 0900    Securement Method Securing device (Describe) 11/03/22 0900   Output (mL) 1200 mL 11/03/22 0900      Wound 11/02/22 Chest Upper Left Pacemaker site (Active)   Site Assessment Clean;Dry;Intact 11/02/22 2000   Margins BEE 11/02/22 2000   Closure BEE 11/02/22 2000   Drainage Amount None 11/02/22 2000   Dressing Options Dry Gauze;Transparent Film 11/02/22 2000   Dressing Changed Observed 11/02/22 2000   Dressing Status Clean;Dry;Intact 11/02/22 2000       Peripheral IV 11/02/22 18 G Anterior;Left Forearm (Active)   Site Assessment Clean;Dry;Intact 11/03/22 0200   Dressing Type Transparent 11/03/22 0200   Line Status Saline locked 11/03/22 0200   Dressing Status Clean;Dry;Intact 11/03/22 0200   Dressing Intervention N/A 11/03/22 0200   Infiltration Grading ([facility], [facility]) 0 11/03/22 0200   Phlebitis Scale ([facility] Only) 0 11/03/22 0200       Peripheral IV 11/02/22 20 G Left;Posterior Hand (Active)   Site Assessment Clean;Dry;Intact 11/03/22 0200   Dressing Type Transparent 11/03/22 0200   Line Status Saline locked 11/03/22 0200   Dressing Status Clean;Dry;Intact 11/03/22 0200   Dressing Intervention N/A 11/03/22 0200   Infiltration Grading ([facility], [facility]) 0 11/03/22 0200   Phlebitis Scale ([facility] Only) 0 11/03/22 0200           Urethral Catheter (Active)   Site Assessment Clean;Skin intact 11/03/22 0900   Collection Container Standard drainage bag 11/03/22 0900   Urinary Catheter Care Drainage Tube Extended;Drainage Tubing Properly Secured;Drainage Bag Below Bladder Level and Not on Floor 11/03/22 0900   Securement Method Securing device (Describe) 11/03/22 0900   Output (mL) 1200 mL 11/03/22 0900        MENTAL STATUS ON TRANSFER             CONSULTATIONS  Cardiology    PROCEDURES  Permanent pacemaker as noted above    LABORATORY  Lab Results   Component Value Date    SODIUM 141 11/02/2022    POTASSIUM 4.3 11/02/2022    CHLORIDE 105 11/02/2022    CO2 29 11/02/2022    GLUCOSE 107 (H) 11/02/2022    BUN 17 11/02/2022     CREATININE 0.77 11/02/2022        Lab Results   Component Value Date    WBC 5.6 11/02/2022    HEMOGLOBIN 9.7 (L) 11/02/2022    HEMATOCRIT 31.6 (L) 11/02/2022    PLATELETCT 123 (L) 11/02/2022        Total time of the discharge process exceeds 35 minutes.  Most of that time spent with the patient reviewing discharge planning and instructions.  Case discussed with cardiology on day of discharge.

## 2022-11-03 NOTE — DISCHARGE PLANNING
DC Transport Scheduled    Received request at: 11/3/2022 0857    Transport Company Scheduled:  [facility]  Spoke with Petra at [facility]     Scheduled Date: 11/4/2022  Scheduled Time: 1200    Destination: [facility]  [address] [place] [id number]    Notified care team of scheduled transport via Vo[employer]te.     If there are any changes needed to the DC transportation scheduled, please contact [facility] at ext. [phone] between the hours of 4759-9339 Mon-Fri. If outside those hours, contact the ED Case Manager at ext. [phone].

## 2022-11-03 NOTE — DISCHARGE PLANNING
Agency/Facility Name: [facility]  Outcome: DPA faxed updated procedure notes per RN CM discussion.      1139  Agency/Facility Name: [facility]  Outcome: DPA faxed updated cardiology and D/C summary        1517  Agency/Facility Name: [facility]  Outcome: DPA faxed updated PT notes per RN CM discussion.

## 2022-11-03 NOTE — THERAPY
"Occupational Therapy   Initial Evaluation     Patient Name: Lindsey Shell  Age:  86 y.o., Sex:  female  Medical Record #: [id number]  Today's Date: 11/3/2022     Precautions: Fall Risk  Comments: pacemaker precautions    Assessment    Patient is 86 y.o. female resident of a SNF in [place] admitted after multiple syncopal episodes with up to 20 sec pauses, now s/p PPM placement. Educated pt on pacemaker precautions and provided handout for reference of education. Pt reports she receives assist for all ADLs as needed from SNF staff but is motivated to return to SNF and continue with therapy to increase her functional independence and activity tolerance.     Plan    Recommend Occupational Therapy 3 times per week until therapy goals are met for the following treatments:  Adaptive Equipment, Self Care/Activities of Daily Living, Therapeutic Activities, and Therapeutic Exercises.    DC Equipment Recommendations: None  Discharge Recommendations: Recommend post-acute placement for additional occupational therapy services prior to discharge home (return to her prior SNF residence in [facility])     Subjective    \"I usually stand and twist around to my wheelchair, but sometimes they use the Eleanor Lift.\"     Objective       11/03/22 1435   Prior Living Situation   Prior Services Continuous (24 Hour) Care Giving Per Service   Housing / Facility Skilled Nursing Facility   Steps Into Home 0   Steps In Home 0   Bathroom Set up Walk In Shower;Grab Bars;Shower Chair   Equipment Owned Front-Wheel Walker;Wheelchair;Portable Patient Lift   Lives with - Patient's Self Care Capacity Attendant / Paid Care Giver   Comments pt has been at a skilled facility in [place] since January   Prior Level of ADL Function   Self Feeding Independent   Grooming / Hygiene Independent   Bathing Requires Assist   Dressing Requires Assist   Toileting Requires Assist   Prior Level of IADL Function   Medication Management Requires Assist   Laundry " Requires Assist   Kitchen Mobility Requires Assist   Finances Requires Assist   Home Management Requires Assist   Shopping Requires Assist   Prior Level Of Mobility Uses Wheel Chair for Community Mobility   Precautions   Precautions Fall Risk   Comments pacemaker precautions   Pain 0 - 10 Group   Therapist Pain Assessment Nurse Notified;Post Activity Pain Same as Prior to Activity;0   Cognition    Level of Consciousness Alert   Comments question mild memory impairment, difficulty retaining new information regarding PPM precautions   Strength Upper Body   Upper Body Strength  X   Gross Strength Generalized Weakness, Equal Bilaterally.    Coordination Upper Body   Coordination WDL   Balance Assessment   Sitting Balance (Static) Fair   Sitting Balance (Dynamic) Fair   Standing Balance (Static) Poor   Weight Shift Sitting Fair   Weight Shift Standing Poor   Comments FWW   Bed Mobility    Supine to Sit Minimal Assist   Sit to Supine Maximal Assist   Scooting Contact Guard Assist   ADL Assessment   Eating Modified Independent   Grooming Supervision;Seated   Upper Body Dressing Moderate Assist   Lower Body Dressing Maximal Assist   Toileting Maximal Assist   How much help from another person does the patient currently need...   Putting on and taking off regular lower body clothing? 2   Bathing (including washing, rinsing, and drying)? 2   Toileting, which includes using a toilet, bedpan, or urinal? 2   Putting on and taking off regular upper body clothing? 2   Taking care of personal grooming such as brushing teeth? 3   Eating meals? 3   6 Clicks Daily Activity Score 14   Functional Mobility   Sit to Stand Minimal Assist   Bed, Chair, Wheelchair Transfer Unable to Participate   Mobility sit>stand only   Activity Tolerance   Sitting Edge of Bed 10min   Standing x3 30 second stands   Patient / Family Goals   Patient / Family Goal #1 return to SNF in [place]   Short Term Goals   Short Term Goal # 1 pt will demo functional  transfer with [place]   Short Term Goal # 2 pt will complete UB dress with SPV   Short Term Goal # 3 pt will complete seated grooming ADLs with set-up assist   Education Group   Education Provided Role of Occupational Therapist;Activities of Daily Living;Transfers   Role of Occupational Therapist Patient Response Patient;Acceptance;Explanation;Verbal Demonstration   Transfers Patient Response Patient;Acceptance;Explanation;Verbal Demonstration   ADL Patient Response Patient;Acceptance;Explanation;Verbal Demonstration   Problem List   Problem List Decreased Homemaking Skills;Decreased Active Daily Living Skills;Decreased Functional Mobility;Decreased Activity Tolerance;Safety Awareness Deficits / Cognition;Impaired Cognitive Function   Anticipated Discharge Equipment and Recommendations   DC Equipment Recommendations None   Discharge Recommendations Recommend post-acute placement for additional occupational therapy services prior to discharge home  (return to SNF in [facility])

## 2022-11-03 NOTE — THERAPY
Physical Therapy   Initial Evaluation     Patient Name: Lindsey Shell  Age:  86 y.o., Sex:  female  Medical Record #: [id number]  Today's Date: 11/3/2022     Precautions  Precautions: Fall Risk  Comments: pacemaker precautions    Assessment  Patient is 86 y.o. female admitted from nursing home in [place] with multiple syncopal episodes with AF with RVR and observed sinus arrest pauses, now s/p  Dual-chamber pacemaker placed on 11/2..  Additional factors influencing patient status / progress : today, pt describes baseline function as usually getting herself to EOB and with staff assist doing a stand-pivot to her wc on most days, but about 3x/week the staff uses a yassine lift when she is too tired. Today, pt is able to come to EOB with min A and sit to stand with min A. Pt's Left leg is sore, but tolerates weight bearing with no instability. Pt reports legs feeling weak, but no buckling in standing. Pt will likely use yassine lift initially at SNF until therapy staff can work on her LE endurance to get back to baseline. Pt was instructed that the only difference when staff uses the ayssine now that she has had the pacemaker placed is that she will keep her L hand on her belly while she reaches up to bar on yassine with her right hand while staff does their usually yassine lift transfer to chair. Pt clear on this. PT to follow as needed while still in house. .      Plan    Recommend Physical Therapy 3 times per week until therapy goals are met for the following treatments:  Bed Mobility, Neuro Re-Education / Balance, and Therapeutic Activities    DC Equipment Recommendations: None  Discharge Recommendations: Recommend post-acute placement for additional physical therapy services prior to discharge home (return to her familiar SNF)            Objective       11/03/22 1434   Charge Group   PT Evaluation PT Evaluation Mod   Total Time Spent   PT Total Time Yes   PT Evaluation Time Spent (Mins) 25   PT Total Time Spent (Calculated)  25   Initial Contact Note    Initial Contact Note Order Received and Verified, Physical Therapy Evaluation in Progress with Full Report to Follow.   Precautions   Precautions Fall Risk   Comments pacemaker precautions   Vitals   O2 (LPM) 4   O2 Delivery Device Nasal Cannula   Pain 0 - 10 Group   Therapist Pain Assessment During Activity;Nurse Notified;0   Prior Living Situation   Prior Services Continuous (24 Hour) Care Giving Family;Intermittent Physical Support for ADL Per Service   Housing / Facility Skilled Nursing Facility   Steps Into Home 0   Steps In Home 0   Equipment Owned Front-Wheel Walker;Wheelchair   Lives with - Patient's Self Care Capacity   (staff assist)   Prior Level of Functional Mobility   Bed Mobility Independent   Transfer Status Required Assist   Ambulation Dependent   Assistive Devices Used Front-Wheel Walker   Wheelchair Required Assist   Comments pt describes doing stand-pivot to wc with staff assist most of the time, but staff uses a yassine lift about 3x/week when pt reports feeling too tired to do stand pivot.   Cognition    Level of Consciousness Alert   Active ROM Lower Body    Active ROM Lower Body  WDL   Strength Lower Body   Lower Body Strength  X   Comments L LE limited by pain, but functional for sit to stand. Pt does not ambulate at baseline, does only stand-pivot to wc at baseline.   Balance Assessment   Sitting Balance (Static) Fair   Sitting Balance (Dynamic) Fair   Standing Balance (Static) Poor   Weight Shift Sitting Fair   Weight Shift Standing Poor   Comments standing with FWW   Gait Analysis   Gait Level Of Assist Unable to Participate   Bed Mobility    Supine to Sit Minimal Assist   Sit to Supine Maximal Assist  (needed legs lifted for BTB which staff does at [facility])   Scooting Contact Guard Assist   Rolling   (pivoted with HOB up)   Functional Mobility   Sit to Stand Minimal Assist  (bed height raised)   Comments sit to stand only at EOB x 3 reps.   How much difficulty does  the patient currently have...   Turning over in bed (including adjusting bedclothes, sheets and blankets)? 3   Sitting down on and standing up from a chair with arms (e.g., wheelchair, bedside commode, etc.) 3   Moving from lying on back to sitting on the side of the bed? 3   How much help from another person does the patient currently need...   Moving to and from a bed to a chair (including a wheelchair)? 3   Need to walk in a hospital room? 1   Climbing 3-5 steps with a railing? 1   6 clicks Mobility Score 14   Activity Tolerance   Sitting Edge of Bed 5+ min   Standing 60 seconds x 3   Short Term Goals    Short Term Goal # 1 Pt will perform bed mobility with supervision for OOB and min A for BTB in 6 visits.   Short Term Goal # 2 Pt will stand pivot to wc with min A in 6 visits.   Education Group   Education Provided Role of Physical Therapist   Role of Physical Therapist Patient Response Patient;Acceptance;Explanation;Verbal Demonstration   Problem List    Problems Impaired Bed Mobility;Impaired Transfers   Anticipated Discharge Equipment and Recommendations   DC Equipment Recommendations None   Discharge Recommendations Recommend post-acute placement for additional physical therapy services prior to discharge home  (return to her familiar SNF)   Interdisciplinary Plan of Care Collaboration   IDT Collaboration with  Nursing   Patient Position at End of Therapy In Bed;Call Light within Reach;Tray Table within Reach;Phone within Reach   Collaboration Comments bill updated   Session Information   Date / Session Number  11/3-1 (1/3, 11/9)

## 2022-11-03 NOTE — DISCHARGE PLANNING
Patient has discharge orders this am.  CM called to [facility] and spoke to Jasvir [phone] she is the care coordinator, let them know patient is ready to come back, they need to have more information before they can say she is good to come back.  Per Jasivr they need more information about her. Reviewed medical records with jasvir that had already been sent and patient plan of care, patient has a new pacemaker, facility requesting new PT/OT notes sent to them, orders requested from MD and bedside nurse has contacted therapy to see patient.  Updates and notes from Cardiology NP sent to facility  with discharge summary and they will look them over and let CM know.  CM sent a request to ride line they want a deposit prior to setting up transport.  Patient medicaid not showing daughter gave CM the medicaid number and sent to the [facility] to have them add to her information.  CM called to [facility] and patient does not have any transportation benefits. Called back to [facility] and they are able to schedule it for 1200 tomorrow is the earliest because they need to go to [place].  CM called to patient daughter to let her know that patient is not able to leave until tomorrow at 12. Per daughter she is fine with that. MD and patient bedside nurse notified. MAXINE called to Jasvir and let her know transport time she would like CM to call her in the am, to be sure everything is still on track.

## 2022-11-03 NOTE — RESPIRATORY CARE
"COPD EDUCATION by COPD CLINICAL EDUCATOR  11/3/2022 at 2:17 PM by Sarah Dang, RRT     Patient interviewed by COPD education team. Patient refused COPD program at this time.        COPD Assessment  COPD Clinical Specialists ONLY  COPD Education Initiated: Yes--Short Intervention (pt doent believe she has copd, wears 02 3.5L, no meds, PFT done in [facility], per patient low grade copd, no data found. Pt lives in nursing home)    PFT Results    No results found for: PFT    Meds to Beds  Would the patient like to opt in for Bedside Medication Delivery at Discharge?: Unable to ask  No respiratory medication, action plan not completed      MY COPD ACTION PLAN     It is recommended that patients and physicians /healthcare providers complete this action plan together. This plan should be discussed at each physician visit and updated as needed.    The green, yellow and red zones show groups of symptoms of COPD. This list of symptoms is not comprehensive, and you may experience other symptoms. In the \"Actions\" column, your healthcare provider has recommended actions for you to take based on your symptoms.    Patient Name: Lindsey Shell   YOB: 1935   Last Updated on:     Green Zone:  I am doing well today Actions     Usual activitiy and exercise level   Take daily medications     Usual amounts of cough and phlegm/mucus   Use oxygen as prescribed     Sleep well at night   Continue regular exercise/diet plan     Appetite is good   At all times avoid cigarette smoke, inhaled irritants     Daily Medications (these medications are taken every day):                Yellow Zone:  I am having a bad day or a COPD flare Actions     More breathless than usual   Continue daily medications     I have less energy for my daily activities   Use quick relief inhaler as ordered     Increased or thicker phlegm/mucus   Use oxygen as prescribed     Using quick relief inhaler/nebulizer more often   Get plenty of rest " "    Swelling of ankles more than usual   Use pursed lip breathing     More coughing than usual   At all times avoid cigarette smoke, inhaled irritants     I feel like I have a \"chest cold\"     Poor sleep and my symptoms woke me up     My appetite is not good     My medicine is not helping      Call provider immediately if symptoms don’t improve     Continue daily medications, add rescue medications:               Medications to be used during a flare up, (as Discussed with Provider):              Red Zone:  I need urgent medical care Actions     Severe shortness of breath even at rest   Call 911 or seek medical care immediately     Not able to do any activity because of breathing      Fever or shaking chills      Feeling confused or very drowsy       Chest pains      Coughing up blood                  "

## 2022-11-03 NOTE — PROGRESS NOTES
Cardiology Initial Consultation    Date of Service  11/3/2022    Referring Physician  Alton Bae, *    Reason for Consultation  pAF with RVR, SB with sinus pauses.     EP consulted for rhythm management and PPM placement.     History of Presenting Illness  Lindsey Shell is a 86 y.o. female admitted from nursing home in [place] with multiple syncopal episodes with AF with RVR and observed sinus arrest pauses.     Per EMS report patient rhythm was AF with RVR (130's-140's) with bradycardia and long sinus pauses. Patient received 150 mg amio in route.     Patient arrived with transcutaneous pacer in place. Underwent temporary pacemaker placement with am with Dr. Delarosa with subsequent dual chamber MDT ppm placement with Dr. Gray on 11/2/2022    Other past medical history pertinent for COPD with O2 dependence (3.5 L via NC), obesity and depression.    Interim Events:  Feeling well overall. Denies having any significant concerns or complaints.  No acute events overnight.  Plan is for discharge back to SNF today.     Review of Systems  Review of Systems   Constitutional:  Negative for fever.   Respiratory:  Negative for chest tightness and shortness of breath.    Cardiovascular:  Negative for chest pain, palpitations and leg swelling.   Gastrointestinal:  Negative for abdominal pain and blood in stool.   Genitourinary:  Negative for hematuria.   Musculoskeletal:  Negative for gait problem.   Neurological:  Negative for dizziness and syncope.   All other systems reviewed and are negative.    Past Medical History   has a past medical history of Chronic obstructive pulmonary disease (HCC).    Surgical History   has no past surgical history on file.    Family History  family history is not on file.    Social History   reports that she has never smoked. She has never used smokeless tobacco. She reports that she does not currently use alcohol. She reports that she does not use drugs.    Medications  Prior to  Admission Medications   Prescriptions Last Dose Informant Patient Reported? Taking?   CALCIUM CARB-CHOLECALCIFEROL PO   Yes Yes   Sig: Take 1 Tablet by mouth 2 times a day with meals.   HYDROcodone-acetaminophen (NORCO) 5-325 MG Tab per tablet   Yes Yes   Sig: Take 0.5 Tablets by mouth 3 times a day.   atorvastatin (LIPITOR) 10 MG Tab   Yes Yes   Sig: Take 10 mg by mouth every day.   bumetanide (BUMEX) 1 MG Tab   Yes Yes   Sig: Take 1 mg by mouth every day. Hold if SBP < 100 pr DBP < 60   celecoxib (CELEBREX) 100 MG Cap   Yes Yes   Sig: Take 100 mg by mouth 2 times a day.   diclofenac sodium (VOLTAREN) 1 % Gel   Yes Yes   Sig: Apply  topically 3 times a day. Apply to R shoulder   docusate sodium (COLACE) 100 MG/10ML Liquid   Yes Yes   Sig: Take 100 mg by mouth 2 times a day.   gabapentin (NEURONTIN) 100 MG Cap   Yes Yes   Sig: Take 100 mg by mouth 2 times a day.   lidocaine (LIDODERM) 5 % Patch   Yes Yes   Sig: Place 2 Patches on the skin every 24 hours. BL knees   lidocaine (XYLOCAINE) 5 % Ointment   Yes Yes   Sig: Apply  topically 3 times a day. Apply to BL hands   omeprazole (PRILOSEC) 20 MG delayed-release capsule   Yes Yes   Sig: Take 20 mg by mouth every day.   polyethylene glycol/lytes (MIRALAX) 17 g Pack   Yes Yes   Sig: Take 17 g by mouth every day.   potassium chloride (KLOR-CON) 20 MEQ Pack   Yes Yes   Sig: Take 20 mEq by mouth 2 times a day.   sertraline (ZOLOFT) 100 MG Tab   Yes Yes   Sig: Take 200 mg by mouth every day.   therapeutic multivitamin-minerals (THERAGRAN-M) Tab   Yes Yes   Sig: Take 1 Tablet by mouth every day.   tolterodine ER (DETROL-LA) 2 MG CAPSULE SR 24 HR   Yes Yes   Sig: Take 2 mg by mouth every day.      Facility-Administered Medications: None       Allergies  Allergies   Allergen Reactions    Erythromycin Swelling     Hand swelling    Penicillins Swelling     Mouth, denies anaphylaxis     Sulfa Drugs Swelling     Mouth, denies anaphylaxis        Vital signs in last 24 hours  Temp:   [36.8 °C (98.2 °F)-37.3 °C (99.1 °F)] 36.8 °C (98.2 °F)  Pulse:  [60-82] 64  Resp:  [11-32] 28  BP: ()/(46-78) 115/71  SpO2:  [87 %-98 %] 90 %    Physical Exam  Physical Exam  Constitutional:       General: She is not in acute distress.     Appearance: Normal appearance.   HENT:      Head: Normocephalic.   Cardiovascular:      Rate and Rhythm: Normal rate and regular rhythm.      Pulses: Normal pulses.      Heart sounds: Normal heart sounds.   Pulmonary:      Effort: Pulmonary effort is normal.      Breath sounds: Normal breath sounds.   Chest:      Comments: PPM site is without hematoma with CDI dressing in place.   Abdominal:      Palpations: Abdomen is soft.      Tenderness: There is no abdominal tenderness.   Musculoskeletal:      Cervical back: Normal range of motion.      Right lower leg: No edema.      Left lower leg: No edema.   Skin:     General: Skin is warm and dry.   Neurological:      Mental Status: She is alert and oriented to person, place, and time.   Psychiatric:         Mood and Affect: Mood normal.         Behavior: Behavior normal.         Thought Content: Thought content normal.       Lab Review  Lab Results   Component Value Date/Time    WBC 5.6 11/02/2022 01:05 AM    RBC 3.31 (L) 11/02/2022 01:05 AM    HEMOGLOBIN 9.7 (L) 11/02/2022 01:05 AM    HEMATOCRIT 31.6 (L) 11/02/2022 01:05 AM    MCV 95.5 11/02/2022 01:05 AM    MCH 29.3 11/02/2022 01:05 AM    MCHC 30.7 (L) 11/02/2022 01:05 AM    MPV 10.2 11/02/2022 01:05 AM      Lab Results   Component Value Date/Time    SODIUM 141 11/02/2022 01:05 AM    POTASSIUM 4.3 11/02/2022 01:05 AM    CHLORIDE 105 11/02/2022 01:05 AM    CO2 29 11/02/2022 01:05 AM    GLUCOSE 107 (H) 11/02/2022 01:05 AM    BUN 17 11/02/2022 01:05 AM    CREATININE 0.77 11/02/2022 01:05 AM      Lab Results   Component Value Date/Time    ASTSGOT 22 11/02/2022 01:05 AM    ALTSGPT 18 11/02/2022 01:05 AM     Lab Results   Component Value Date/Time    TROPONINT 22 (H) 11/01/2022 10:35  PM       Recent Labs     11/02/22  0105   NTPROBNP 798*           Assessment/Plan  Tachy-Newton Syndrome  Sinus Arrest   - S/P successful MDT dual-chamber pacemaker implantation with Dr. Gray on 11/2/2022.   - CXR shows no late signs of PTX, leads appear to be in correct placement.   - EKG and telemetry monitor shows A paced rhythm, no acute findings.   - Device interrogation today showed normal sensing and function.  - Left upper pacemaker site CDI, uncomplicated.   - Discussed pacemaker discharge education and care with patient at bedside per below. All pt questions/concerns were answered, patient verbalized understanding.I also discussed post PPM follow up care with the head nurse practitioner at her SNF. I attempted to call patients daughter twice but was unable to reach her.   - Patient will follow up with pacemaker clinic in 1 week for pacemaker check if it is not able to be completed in [place].       pAF:  - New diagnosis, patient is asymptomatic.   TTE showed preserved LV function with no significant valvular abnormality.   - TSH WNL.   - Currently maintaining NSR.   - Consider use of AV quintin blocker to control RVR if needed.   - New diagnosis of AF. Patient is asymptomatic. [id number]-TLEw score is 3 (age and gender). Discussed the risks and benefits of OAC with patient. Patient denies history of significant bleeding. Xarelto 20 mg daily started by primary team.   - Will monitor PPM burden via PPM interrogations.       Thank you for allowing me to participate in the care of this patient.    EP will sign off. Follow up arranged as below.    Future Appointments   Date Time Provider Department Center   11/10/2022  1:00 PM PACER CHECK-[facility] None     Please contact me with any questions.    FAIZA Louie.   Cardiology, [facility]  [phone]      Pacemaker Discharge Instructions/[facility] Cardiology    1.  No showers until seen in follow up; may take  sponge bath.  Keep dressing dry & in place until seen at for you follow up visit at the cardiology office.     2.  No lifting over 10 lbs with affected arm for six weeks.  3.  Do not raise affected arm above shoulder level or behind head for six weeks.  4.  Avoid excessive pushing, pulling, or twisting for six weeks.  5.  No driving for the first week.  6.  Call our office ([phone]) if you notice any increased swelling, redness, warmth, or drainage at the implant site.  7.  Needs to be seen in emergency if you develop fever > 101F or uncontrolled pain.  8.  Always check with device clinic before any planned MRI to see if device is MRI compatible.  9.  No routine dental work or cleanings for 3 months.  10.  May remove arm sling after one day, but please wear if you have trouble remembering to keep your arm down.  Please wear at night as a reminder.   11. Do not place cell phones or mobile devices directly over implanted device.         My total time spent caring for the patient on the day of the encounter was 22 minutes.     This does not include time spent on separately billable procedures/tests.

## 2022-11-04 VITALS
DIASTOLIC BLOOD PRESSURE: 57 MMHG | WEIGHT: 274.03 LBS | RESPIRATION RATE: 18 BRPM | HEIGHT: 61 IN | BODY MASS INDEX: 51.74 KG/M2 | HEART RATE: 60 BPM | SYSTOLIC BLOOD PRESSURE: 103 MMHG | OXYGEN SATURATION: 96 % | TEMPERATURE: 97.9 F

## 2022-11-04 PROCEDURE — A9270 NON-COVERED ITEM OR SERVICE: HCPCS | Performed by: INTERNAL MEDICINE

## 2022-11-04 PROCEDURE — 700102 HCHG RX REV CODE 250 W/ 637 OVERRIDE(OP): Performed by: INTERNAL MEDICINE

## 2022-11-04 PROCEDURE — 700101 HCHG RX REV CODE 250: Performed by: INTERNAL MEDICINE

## 2022-11-04 RX ADMIN — SERTRALINE 200 MG: 100 TABLET, FILM COATED ORAL at 04:37

## 2022-11-04 RX ADMIN — LIDOCAINE 2 PATCH: 50 PATCH CUTANEOUS at 04:37

## 2022-11-04 RX ADMIN — GABAPENTIN 100 MG: 100 CAPSULE ORAL at 04:37

## 2022-11-04 RX ADMIN — OXYBUTYNIN CHLORIDE 5 MG: 5 TABLET ORAL at 04:37

## 2022-11-04 RX ADMIN — BUMETANIDE 1 MG: 1 TABLET ORAL at 04:37

## 2022-11-04 RX ADMIN — POTASSIUM CHLORIDE 20 MEQ: 20 TABLET, EXTENDED RELEASE ORAL at 04:37

## 2022-11-04 RX ADMIN — ATORVASTATIN CALCIUM 10 MG: 10 TABLET, FILM COATED ORAL at 04:37

## 2022-11-04 RX ADMIN — OMEPRAZOLE 20 MG: 20 CAPSULE, DELAYED RELEASE ORAL at 04:37

## 2022-11-04 ASSESSMENT — PAIN DESCRIPTION - PAIN TYPE: TYPE: CHRONIC PAIN

## 2022-11-04 NOTE — THERAPY
"Missed Therapy     Patient Name: Lindsey Shell  Age:  86 y.o., Sex:  female  Medical Record #: [id number]  Today's Date: 11/4/2022    Discussed missed therapy with RN. RN informed of OT treatment attempt X2 today. First attempt this AM, pt eating breakfast with grand dtr in room and requested \"OT come back later. \" Second attempt , pt d/cing to [facility] and transport arriving.  OT tx held.        11/04/22 1120   Interdisciplinary Plan of Care Collaboration   IDT Collaboration with  Nursing   Collaboration Comments Attempted to see pt for OT treatment X2 today 11/4. First atmept pt eating breakfast with family in roomnd requestd OT \"come back later\". Second attempt , Pt d/c'ing to prior SNF in [facility]. Transport in route.   Session Information   Date / Session Number  11/3, #1 Attempted OT tx today 11/4 X2. Pt d/cing to [facility], transport arriving. OT tx held.     "

## 2022-11-04 NOTE — PROGRESS NOTES
Bedside report received from day RN, pt care assumed, assessment completed. Pt is A&O4, pain 1/10, partially paced on the monitor. Updated on POC, questions answered. Bed in lowest, locked position, treaded socks on, call light and belongings within reach. Fall precautions in place.

## 2022-11-04 NOTE — PROGRESS NOTES
Pt discharged to SNF via [facility]. All belongings with patient. AVS discussed with patient and granddaughter. All questions answered, pt and family express understanding. Laura, admitting RN at SNF, called upon discharge of patient, report given and all questions answered. Call back number provided. X2 IV's discontinued, catheter tip intact, no complications.

## 2022-11-04 NOTE — PROGRESS NOTES
Assumed care of pt, bedside report received from Kodi JOHNSON. Pt A&Ox 4, no complaints of pain at this time. Updated on POC, call light in reach, and fall precautions in place. Bed locked and in lowest position. Pt instructed to call for assistance before getting out of bed. All questions answered, no other needs at this time.

## 2022-11-04 NOTE — PROGRESS NOTES
Monitor Summary:   Rhythm: SB, SR, Partial paced  Rate: 59-73  Measurement: .23/.08/.48  Ectopy: pvc's, pac's

## 2022-11-04 NOTE — CARE PLAN
Problem: Knowledge Deficit - Standard  Goal: Patient and family/care givers will demonstrate understanding of plan of care, disease process/condition, diagnostic tests and medications  Outcome: Progressing     Problem: Skin Integrity  Goal: Skin integrity is maintained or improved  Outcome: Progressing     Problem: Fall Risk  Goal: Patient will remain free from falls  Outcome: Progressing     Problem: Pain - Standard  Goal: Alleviation of pain or a reduction in pain to the patient’s comfort goal  Outcome: Progressing   The patient is Stable - Low risk of patient condition declining or worsening    Shift Goals  Clinical Goals: monitor telemetry  Patient Goals: discharge  Family Goals: Discharge plans, cardiology follow up    Progress made toward(s) clinical / shift goals:  Progressing    Patient is not progressing towards the following goals:

## 2022-11-04 NOTE — PROGRESS NOTES
4 Eyes Skin Assessment Completed by ALEX Mari and ALEX Earl.    Head WDL  Ears WDL  Nose WDL  Mouth WDL  Neck WDL  Breast/Chest WDL  Shoulder Blades Redness  Spine WDL  (R) Arm/Elbow/Hand WDL  (L) Arm/Elbow/Hand WDL  Abdomen WDL  Groin WDL  Scrotum/Coccyx/Buttocks WDL  (R) Leg WDL  (L) Leg WDL  (R) Heel/Foot/Toe WDL  (L) Heel/Foot/Toe WDL          Devices In Places Tele Box and Pulse Ox      Interventions In Place Q2 Turns    Possible Skin Injury Yes    Pictures Uploaded Into Epic Yes  Wound Consult Placed Yes  RN Wound Prevention Protocol Ordered Yes

## 2022-11-16 ENCOUNTER — TELEPHONE (OUTPATIENT)
Dept: CARDIOLOGY | Facility: MEDICAL CENTER | Age: 87
End: 2022-11-16
Payer: MEDICARE

## 2022-11-16 NOTE — TELEPHONE ENCOUNTER
ADD    Caller:Sharri (daughter) -[phone]    Topic/issue: Has questions on the [employer] and issues with set -up. She mentioned they have left a msg for Mady.     Callback Number: -   [facility] - [phone]    Thank you,   Marie BARRETO

## 2022-11-17 NOTE — TELEPHONE ENCOUNTER
Caller: [facility]    Topic/issue: Their office was calling regarding a My carelink relay home communicator and an account needing to be created for this patient in order for it to be read and have the data from it to be transmitted    Callback Number: [phone] ext [phone]      Thank you    -Diego CAGE

## 2022-11-18 NOTE — TELEPHONE ENCOUNTER
Spoke with Ivonne @ [facility]-- states patient follows with cardiologist in [place]-- advised to contact office to either setup remote monitoring through office or device checks in person.  Direct line given should they have any further questions.